# Patient Record
Sex: FEMALE | Race: WHITE | NOT HISPANIC OR LATINO | Employment: OTHER | URBAN - NONMETROPOLITAN AREA
[De-identification: names, ages, dates, MRNs, and addresses within clinical notes are randomized per-mention and may not be internally consistent; named-entity substitution may affect disease eponyms.]

---

## 2017-06-23 ENCOUNTER — HISTORY (OUTPATIENT)
Dept: FAMILY MEDICINE | Facility: OTHER | Age: 62
End: 2017-06-23

## 2017-06-23 ENCOUNTER — OFFICE VISIT - GICH (OUTPATIENT)
Dept: FAMILY MEDICINE | Facility: OTHER | Age: 62
End: 2017-06-23

## 2017-06-23 DIAGNOSIS — N30.00 ACUTE CYSTITIS WITHOUT HEMATURIA: ICD-10-CM

## 2017-06-23 DIAGNOSIS — R30.0 DYSURIA: ICD-10-CM

## 2017-06-23 LAB
BACTERIA URINE: ABNORMAL BACTERIA/HPF
BILIRUB UR QL: NEGATIVE
CLARITY, URINE: ABNORMAL CLARITY
COLOR UR: YELLOW COLOR
EPITHELIAL CELLS: ABNORMAL EPI/HPF
GLUCOSE URINE: NEGATIVE MG/DL
KETONES UR QL: NEGATIVE MG/DL
LEUKOCYTE ESTERASE URINE: ABNORMAL
NITRITE UR QL STRIP: NEGATIVE
OCCULT BLOOD,URINE - HISTORICAL: ABNORMAL
PH UR: 5.5 [PH]
PROTEIN QUALITATIVE,URINE - HISTORICAL: NEGATIVE MG/DL
RBC - HISTORICAL: ABNORMAL /HPF
SP GR UR STRIP: 1.01
UROBILINOGEN,QUALITATIVE - HISTORICAL: NORMAL EU/DL
WBC - HISTORICAL: ABNORMAL /HPF

## 2017-06-25 LAB
CULTURE - HISTORICAL: ABNORMAL
CULTURE - HISTORICAL: ABNORMAL
SUSCEPTIBILITY RESULT - HISTORICAL: ABNORMAL

## 2017-12-28 NOTE — PROGRESS NOTES
Patient Information     Patient Name Ada Arevalo [id number] Female 1955      Progress Notes by Kandis Hauser NP at 2017  3:15 PM     Author:  Kandis Hauser NP Service:  (none) Author Type:  PHYS- Nurse Practitioner     Filed:  2017  5:55 PM Encounter Date:  2017 Status:  Signed     :  Kandis Hauser NP (PHYS- Nurse Practitioner)            HPI:    Ada Bal is a 62 y.o. female who presents to clinic today for urinary concerns. Having burning, frequency and urgency with urination. Having some pelvic pressure. Sx worse today. Sx started last night. No fever. No hematuria. No n/v/d. No flank pain. Taking cranberry juice for sx. No recurrent UTI's.     Past Medical History:     Diagnosis  Date     Hx of Migraine with neurologic symptoms of expessive aphasia X2.      NVD (normal vaginal delivery)     x2      Premenstrual symptom      anxiety and irritability      Past Surgical History:      Procedure  Laterality Date     TONSIL AND ADENOIDECTOMY  1967     Social History       Substance Use Topics         Smoking status:   Never Smoker     Smokeless tobacco:   Never Used     Alcohol use   Yes      Comment: 1/2 a beer about once a month      Current Outpatient Prescriptions       Medication  Sig Dispense Refill     aspirin enteric coated 81 mg tablet Take 1 tablet by mouth once daily with a meal.  0     omega-3 fatty acids-vitamin E (FISH OIL) 1,000 mg cap Take 1 capsule by mouth once daily.  0     zolpidem (AMBIEN) 10 mg tablet Take 1 tablet by mouth at bedtime if needed for Sleep. 30 tablet 3     No current facility-administered medications for this visit.      Medications have been reviewed by me and are current to the best of my knowledge and ability.    No Known Allergies    ROS:  Pertinent positives and negatives are noted in HPI.    EXAM:  General appearance: well appearing female, in no acute distress  Respiratory: clear to auscultation bilaterally  Cardiac:  RRR with no murmurs  Abdomen: soft, nontender, no CVAT  Psychological: normal affect, alert and pleasant  Lab:   Results for orders placed or performed in visit on 06/23/17      URINALYSIS W REFLEX MICROSCOPIC IF POSITIVE      Result  Value Ref Range    COLOR                     Yellow Yellow Color    CLARITY                   Slightly Cloudy (A) Clear Clarity    SPECIFIC GRAVITY,URINE    1.010 1.010, 1.015, 1.020, 1.025                    PH,URINE                  5.5 6.0, 7.0, 8.0, 5.5, 6.5, 7.5, 8.5                    UROBILINOGEN,QUALITATIVE  Normal Normal EU/dl    PROTEIN, URINE Negative Negative mg/dL    GLUCOSE, URINE Negative Negative mg/dL    KETONES,URINE             Negative Negative mg/dL    BILIRUBIN,URINE           Negative Negative                    OCCULT BLOOD,URINE        Moderate (A) Negative                    NITRITE                   Negative Negative                    LEUKOCYTE ESTERASE        Small (A) Negative                   URINALYSIS MICROSCOPIC      Result  Value Ref Range    RBC 3-5 (A) 0-2, None Seen /HPF    WBC 6-10 (A) 0-2, 3-5, None Seen /HPF    BACTERIA                  Few None Seen, Rare, Occasional, Few Bacteria/HPF    EPITHELIAL CELLS          Few None Seen, Few Epi/HPF         ASSESSMENT/PLAN:    ICD-10-CM    1. Acute cystitis without hematuria N30.00 URINE CULTURE      trimethoprim-sulfamethoxazole, 160-800 mg, (BACTRIM DS, SEPTRA DS) tablet      URINE CULTURE   2. Dysuria R30.0 URINALYSIS W REFLEX MICROSCOPIC IF POSITIVE      URINALYSIS W REFLEX MICROSCOPIC IF POSITIVE      URINALYSIS MICROSCOPIC      URINALYSIS MICROSCOPIC      URINE CULTURE      URINE CULTURE   UA with leukocytes, few bacteria. She does have classic sx. Will tx empirically with bactrim and UC ordered. Reviewed need to complete all antibiotics. Discussed typical course of illness, symptomatic treatment and when to return to clinic. Patient in agreement with plan and all questions were answered.      Patient Instructions   Antibiotics per order.  Drink plenty of fluids.   Return to clinic if any fevers, vomiting, or flank pain develops as this may be a sign the infection has moved to your kidney's.  We have initiated a urine culture. If any changes are needed in your antibiotics, we will notify you when the results have returned.     AZO

## 2017-12-28 NOTE — PATIENT INSTRUCTIONS
Patient Information     Patient Name MRAda Jackson [id number] Female 1955      Patient Instructions by Kandis Hauser NP at 2017  3:15 PM     Author:  Kandis Hauser NP Service:  (none) Author Type:  PHYS- Nurse Practitioner     Filed:  2017  4:04 PM Encounter Date:  2017 Status:  Signed     :  Kandis Hauser NP (PHYS- Nurse Practitioner)            Antibiotics per order.  Drink plenty of fluids.   Return to clinic if any fevers, vomiting, or flank pain develops as this may be a sign the infection has moved to your kidney's.  We have initiated a urine culture. If any changes are needed in your antibiotics, we will notify you when the results have returned.     AZO

## 2017-12-30 NOTE — NURSING NOTE
Patient Information     Patient Name MRN Ada Shrestha [id number] Female 1955      Nursing Note by Gladys Pichardo at 2017  3:15 PM     Author:  Gladys Pichardo Service:  (none) Author Type:  (none)     Filed:  2017  3:59 PM Encounter Date:  2017 Status:  Signed     :  Gladys Pichardo            Patient is here today with c/o dysuria and frequency. Gladys Pichardo LPN......................2017 3:43 PM

## 2018-01-16 RX ORDER — CHLORAL HYDRATE 500 MG
1 CAPSULE ORAL
COMMUNITY
Start: 2012-12-12

## 2018-01-16 RX ORDER — ECHINACEA 500 MG
CAPSULE ORAL
COMMUNITY
Start: 2017-06-23 | End: 2019-02-08

## 2018-01-16 RX ORDER — ASPIRIN 81 MG/1
81 TABLET ORAL
COMMUNITY
Start: 2012-12-12

## 2018-01-27 VITALS
TEMPERATURE: 98.5 F | HEIGHT: 63 IN | DIASTOLIC BLOOD PRESSURE: 68 MMHG | BODY MASS INDEX: 27.04 KG/M2 | HEART RATE: 68 BPM | SYSTOLIC BLOOD PRESSURE: 122 MMHG | WEIGHT: 152.6 LBS

## 2018-03-25 ENCOUNTER — HEALTH MAINTENANCE LETTER (OUTPATIENT)
Age: 63
End: 2018-03-25

## 2019-02-08 ENCOUNTER — OFFICE VISIT (OUTPATIENT)
Dept: FAMILY MEDICINE | Facility: OTHER | Age: 64
End: 2019-02-08
Attending: NURSE PRACTITIONER
Payer: MEDICARE

## 2019-02-08 ENCOUNTER — HOSPITAL ENCOUNTER (OUTPATIENT)
Dept: GENERAL RADIOLOGY | Facility: OTHER | Age: 64
Discharge: HOME | End: 2019-02-08
Attending: PHYSICIAN ASSISTANT | Admitting: PHYSICIAN ASSISTANT
Payer: MEDICARE

## 2019-02-08 VITALS
TEMPERATURE: 101.4 F | HEIGHT: 64 IN | RESPIRATION RATE: 24 BRPM | DIASTOLIC BLOOD PRESSURE: 76 MMHG | SYSTOLIC BLOOD PRESSURE: 136 MMHG | WEIGHT: 154.8 LBS | OXYGEN SATURATION: 91 % | BODY MASS INDEX: 26.43 KG/M2 | HEART RATE: 117 BPM

## 2019-02-08 DIAGNOSIS — R05.9 COUGH: ICD-10-CM

## 2019-02-08 DIAGNOSIS — J18.9 COMMUNITY ACQUIRED PNEUMONIA OF RIGHT LUNG, UNSPECIFIED PART OF LUNG: Primary | ICD-10-CM

## 2019-02-08 PROCEDURE — G0463 HOSPITAL OUTPT CLINIC VISIT: HCPCS

## 2019-02-08 PROCEDURE — 99214 OFFICE O/P EST MOD 30 MIN: CPT | Performed by: PHYSICIAN ASSISTANT

## 2019-02-08 PROCEDURE — 71046 X-RAY EXAM CHEST 2 VIEWS: CPT

## 2019-02-08 PROCEDURE — G0463 HOSPITAL OUTPT CLINIC VISIT: HCPCS | Mod: 25

## 2019-02-08 RX ORDER — BENZONATATE 100 MG/1
CAPSULE ORAL
Qty: 30 CAPSULE | Refills: 0 | Status: SHIPPED | OUTPATIENT
Start: 2019-02-08 | End: 2019-12-05

## 2019-02-08 RX ORDER — DOXYCYCLINE 100 MG/1
100 CAPSULE ORAL 2 TIMES DAILY
Qty: 14 CAPSULE | Refills: 0 | Status: SHIPPED | OUTPATIENT
Start: 2019-02-08 | End: 2019-02-15

## 2019-02-08 SDOH — HEALTH STABILITY: MENTAL HEALTH: HOW OFTEN DO YOU HAVE A DRINK CONTAINING ALCOHOL?: NEVER

## 2019-02-08 ASSESSMENT — PAIN SCALES - GENERAL: PAINLEVEL: NO PAIN (0)

## 2019-02-08 ASSESSMENT — MIFFLIN-ST. JEOR: SCORE: 1238.04

## 2019-02-08 NOTE — NURSING NOTE
Chief Complaint   Patient presents with     Cough       Medication Reconciliation: complete   Patient presents with cough nonproductive starting last Saturday. Patient has tried mucinex with no relief. Mera Crenshaw LPN .............2/8/2019  1:59 PM

## 2019-02-08 NOTE — PATIENT INSTRUCTIONS
Community acquired pneumonia  Chest xray shows a consolidation on the right side  Rest, fluids  Humidified air  Start doxycycline 100 mg oral tablet, take every 12 hours for 7 days. This can cause sun sensitivity, use caution in the sun. Take with food.   OTC Robitussin DM every 4 hours as needed for cough  Benzonatate 100 mg oral capsule, take 1-2 capsules 3 times daily as needed for cough  Ibuprofen or tylenol as needed for comfort, fever  Follow up with PCP if symptoms persist or worsen  Seek immediate care for    You don t get better within the first 48 hours of treatment    Shortness of breath gets worse    Rapid breathing (more than 25 breaths per minute)    Coughing up blood    Chest pain gets worse with breathing    Fever of 100.4 F (38 C) or higher that doesn t get better with fever medicine    Weakness, dizziness, or fainting that gets worse    Thirst or dry mouth that gets worse    Sinus pain, headache, or a stiff neck    Chest pain not caused by coughing    Patient Education     Pneumonia (Adult)  Pneumonia is an infection deep within the lungs. It is in the small air sacs (alveoli). Pneumonia may be caused by a virus or bacteria. Pneumonia caused by bacteria is usually treated with an antibiotic. Severe cases may need to be treated in the hospital. Milder cases can be treated at home. Symptoms usually start to get better during the first 2 days of treatment.    Home care  Follow these guidelines when caring for yourself at home:    Rest at home for the first 2 to 3 days, or until you feel stronger. Don t let yourself get overly tired when you go back to your activities.    Stay away from cigarette smoke - yours or other people s.    You may use acetaminophen or ibuprofen to control fever or pain, unless another medicine was prescribed. If you have chronic liver or kidney disease, talk with your healthcare provider before using these medicines. Also talk with your provider if you ve had a stomach ulcer  or gastrointestinal bleeding. Don t give aspirin to anyone younger than 18 years of age who is ill with a fever. It may cause severe liver damage.    Your appetite may be poor, so a light diet is fine.    Drink 6 to 8 glasses of fluids every day to make sure you are getting enough fluids. Beverages can include water, sport drinks, sodas without caffeine, juices, tea, or soup. Fluids will help loosen secretions in the lung. This will make it easier for you to cough up the phlegm (sputum). If you also have heart or kidney disease, check with your healthcare provider before you drink extra fluids.    Take antibiotic medicine prescribed until it is all gone, even if you are feeling better after a few days.  Follow-up care  Follow up with your healthcare provider in the next 2 to 3 days, or as advised. This is to be sure the medicine is helping you get better.  If you are 65 or older, you should get a pneumococcal vaccine and a yearly flu (influenza) shot. You should also get these vaccines if you have chronic lung disease like asthma, emphysema, or COPD. Recently, a second type of pneumonia vaccine has become available for everyone over 65 years old. This is in addition to the previous vaccine. Ask your provider about this.  When to seek medical advice  Call your healthcare provider right away if any of these occur:    You don t get better within the first 48 hours of treatment    Shortness of breath gets worse    Rapid breathing (more than 25 breaths per minute)    Coughing up blood    Chest pain gets worse with breathing    Fever of 100.4 F (38 C) or higher that doesn t get better with fever medicine    Weakness, dizziness, or fainting that gets worse    Thirst or dry mouth that gets worse    Sinus pain, headache, or a stiff neck    Chest pain not caused by coughing  Date Last Reviewed: 1/1/2017 2000-2018 [employer]. [address] [place] [address], [place] [address]. All rights reserved. This information is  not intended as a substitute for professional medical care. Always follow your healthcare professional's instructions.

## 2019-02-08 NOTE — PROGRESS NOTES
"SUBJECTIVE:  Ada Bal is a 64 year old female who presents to the clinic today with a dry harsh cough  Onset 8 days ago,course is worsening  Associated symptoms: mild headache, hoarse, chest discomfort with cough    Treatments - Mucinex D for a week without relief  Exposures -  was ill with similar symptoms  History of asthma and environmental allergies is - negative  Tobacco use or second hand smoke exposure history - negative  No prior pneumonia      Past Medical History:   Diagnosis Date     Encounter for full-term uncomplicated delivery     x2     Migraine without status migrainosus, not intractable     No Comments Provided     Premenstrual tension syndrome     anxiety and irritability      Social History     Tobacco Use     Smoking status: Never Smoker     Smokeless tobacco: Never Used   Substance Use Topics     Alcohol use: No     Frequency: Never     Current Outpatient Medications   Medication     aspirin EC 81 MG EC tablet     fish oil-omega-3 fatty acids 1000 MG capsule     No current facility-administered medications for this visit.       No Known Allergies      ROS  General - fatigue, fever (Max 101.4 in the office)  HENT - negative  Respiratory - POSITIVE per HPI  Abdomen - decreased appetite  Skin - no rash      OBJECTIVE:  Exam:  Vitals:    02/08/19 1359   BP: 136/76   BP Location: Right arm   Patient Position: Sitting   Cuff Size: Adult Regular   Pulse: 117   Resp: 24   Temp: 101.4  F (38.6  C)   TempSrc: Tympanic   SpO2: 91%   Weight: 70.2 kg (154 lb 12.8 oz)   Height: 1.635 m (5' 4.37\")     General: healthy, alert and no distress, appears hydarated, vital signs stable   Head: NORMAL - atraumatic, nontender.  Ears: normal canals, TMs bilaterally  Eyes: NORMAL - no injection no discharge, no periorbital swelling.  Nose: ABNORMAL - swollen nasal turbinates.  Neck: adenopathy noted  Throat: ABNORMAL - moderate erythema, s/p tonsillitis.  Resp: ABNORMAL - normal respiration,  Wheeze "   Cardiac: NORMAL - regular rate and rhythm without murmur.    Recent Results (from the past 24 hour(s))   XR Chest 2 Views    Narrative    PROCEDURE:  XR CHEST 2 VW    HISTORY: cough for 8 days, chest discomfort with cough; Cough, .    COMPARISON:  None.    FINDINGS:  The cardiomediastinal contours are normal.  The trachea is midline.  Small amount of ill-defined consolidation projects over the right  lower lobe. No effusion or pneumothorax is seen.    No suspicious osseous lesion or subdiaphragmatic free air.      Impression    IMPRESSION:      Findings suspicious for right lower lobe pneumonia. Follow-up to  resolution is requested.      MARC CALVERT MD         ASSESSMENT:    (J18.9) Community acquired pneumonia of right lung, unspecified part of lung  (primary encounter diagnosis)  Plan: doxycycline monohydrate (MONODOX) 100 MG         capsule, benzonatate (TESSALON) 100 MG capsule      (R05) Cough  Plan: XR Chest 2 Views    Community acquired pneumonia  Chest xray shows a consolidation on the right side  Rest, fluids  Humidified air  Start doxycycline 100 mg oral tablet, take every 12 hours for 7 days. This can cause sun sensitivity, use caution in the sun. Take with food.   OTC Robitussin DM every 4 hours as needed for cough  Benzonatate 100 mg oral capsule, take 1-2 capsules 3 times daily as needed for cough  Ibuprofen or tylenol as needed for comfort, fever  Follow up with PCP if symptoms persist or worsen  Patient received verbal and written instruction including review of warning signs & follow up    Rhonda Marquez PA-C on 2/8/2019 at 5:10 PM

## 2019-02-11 ENCOUNTER — HOSPITAL ENCOUNTER (EMERGENCY)
Facility: OTHER | Age: 64
Discharge: HOME | End: 2019-02-11
Attending: FAMILY MEDICINE | Admitting: FAMILY MEDICINE
Payer: MEDICARE

## 2019-02-11 ENCOUNTER — APPOINTMENT (OUTPATIENT)
Dept: GENERAL RADIOLOGY | Facility: OTHER | Age: 64
End: 2019-02-11
Attending: FAMILY MEDICINE
Payer: MEDICARE

## 2019-02-11 VITALS
DIASTOLIC BLOOD PRESSURE: 76 MMHG | HEIGHT: 64 IN | OXYGEN SATURATION: 96 % | WEIGHT: 150 LBS | BODY MASS INDEX: 25.61 KG/M2 | SYSTOLIC BLOOD PRESSURE: 121 MMHG | HEART RATE: 84 BPM | TEMPERATURE: 98.6 F | RESPIRATION RATE: 10 BRPM

## 2019-02-11 DIAGNOSIS — R05.9 COUGH: ICD-10-CM

## 2019-02-11 LAB
ANION GAP SERPL CALCULATED.3IONS-SCNC: 7 MMOL/L (ref 3–14)
BASOPHILS # BLD AUTO: 0 10E9/L (ref 0–0.2)
BASOPHILS NFR BLD AUTO: 0.6 %
BUN SERPL-MCNC: 12 MG/DL (ref 7–25)
CALCIUM SERPL-MCNC: 10.9 MG/DL (ref 8.6–10.3)
CHLORIDE SERPL-SCNC: 101 MMOL/L (ref 98–107)
CO2 SERPL-SCNC: 31 MMOL/L (ref 21–31)
CREAT SERPL-MCNC: 0.72 MG/DL (ref 0.6–1.2)
DIFFERENTIAL METHOD BLD: NORMAL
EOSINOPHIL # BLD AUTO: 0.1 10E9/L (ref 0–0.7)
EOSINOPHIL NFR BLD AUTO: 1.7 %
ERYTHROCYTE [DISTWIDTH] IN BLOOD BY AUTOMATED COUNT: 12 % (ref 10–15)
GFR SERPL CREATININE-BSD FRML MDRD: 81 ML/MIN/{1.73_M2}
GLUCOSE SERPL-MCNC: 113 MG/DL (ref 70–105)
HCT VFR BLD AUTO: 43 % (ref 35–47)
HGB BLD-MCNC: 14.4 G/DL (ref 11.7–15.7)
IMM GRANULOCYTES # BLD: 0 10E9/L (ref 0–0.4)
IMM GRANULOCYTES NFR BLD: 0.2 %
LACTATE SERPL-SCNC: 0.9 MMOL/L (ref 0.5–2.2)
LYMPHOCYTES # BLD AUTO: 1.2 10E9/L (ref 0.8–5.3)
LYMPHOCYTES NFR BLD AUTO: 22.4 %
MCH RBC QN AUTO: 31.4 PG (ref 26.5–33)
MCHC RBC AUTO-ENTMCNC: 33.5 G/DL (ref 31.5–36.5)
MCV RBC AUTO: 94 FL (ref 78–100)
MONOCYTES # BLD AUTO: 0.6 10E9/L (ref 0–1.3)
MONOCYTES NFR BLD AUTO: 12.1 %
NEUTROPHILS # BLD AUTO: 3.3 10E9/L (ref 1.6–8.3)
NEUTROPHILS NFR BLD AUTO: 63 %
PLATELET # BLD AUTO: 283 10E9/L (ref 150–450)
POTASSIUM SERPL-SCNC: 4 MMOL/L (ref 3.5–5.1)
RBC # BLD AUTO: 4.59 10E12/L (ref 3.8–5.2)
SODIUM SERPL-SCNC: 139 MMOL/L (ref 134–144)
WBC # BLD AUTO: 5.2 10E9/L (ref 4–11)

## 2019-02-11 PROCEDURE — 96366 THER/PROPH/DIAG IV INF ADDON: CPT | Performed by: FAMILY MEDICINE

## 2019-02-11 PROCEDURE — 96361 HYDRATE IV INFUSION ADD-ON: CPT | Performed by: FAMILY MEDICINE

## 2019-02-11 PROCEDURE — 83605 ASSAY OF LACTIC ACID: CPT | Performed by: FAMILY MEDICINE

## 2019-02-11 PROCEDURE — 87040 BLOOD CULTURE FOR BACTERIA: CPT | Performed by: FAMILY MEDICINE

## 2019-02-11 PROCEDURE — 36415 COLL VENOUS BLD VENIPUNCTURE: CPT | Performed by: FAMILY MEDICINE

## 2019-02-11 PROCEDURE — 99284 EMERGENCY DEPT VISIT MOD MDM: CPT | Mod: 25 | Performed by: FAMILY MEDICINE

## 2019-02-11 PROCEDURE — 80048 BASIC METABOLIC PNL TOTAL CA: CPT | Performed by: FAMILY MEDICINE

## 2019-02-11 PROCEDURE — 85025 COMPLETE CBC W/AUTO DIFF WBC: CPT | Performed by: FAMILY MEDICINE

## 2019-02-11 PROCEDURE — 71046 X-RAY EXAM CHEST 2 VIEWS: CPT

## 2019-02-11 PROCEDURE — 25000128 H RX IP 250 OP 636: Performed by: FAMILY MEDICINE

## 2019-02-11 PROCEDURE — 99283 EMERGENCY DEPT VISIT LOW MDM: CPT | Mod: Z6 | Performed by: FAMILY MEDICINE

## 2019-02-11 PROCEDURE — 87040 BLOOD CULTURE FOR BACTERIA: CPT | Mod: 91 | Performed by: FAMILY MEDICINE

## 2019-02-11 PROCEDURE — 96365 THER/PROPH/DIAG IV INF INIT: CPT | Performed by: FAMILY MEDICINE

## 2019-02-11 RX ORDER — LEVOFLOXACIN 750 MG/1
750 TABLET, FILM COATED ORAL DAILY
Qty: 6 TABLET | Refills: 0 | Status: SHIPPED | OUTPATIENT
Start: 2019-02-11 | End: 2019-02-17

## 2019-02-11 RX ORDER — SODIUM CHLORIDE 9 MG/ML
1000 INJECTION, SOLUTION INTRAVENOUS CONTINUOUS
Status: DISCONTINUED | OUTPATIENT
Start: 2019-02-11 | End: 2019-02-11 | Stop reason: HOSPADM

## 2019-02-11 RX ORDER — LEVOFLOXACIN 5 MG/ML
750 INJECTION, SOLUTION INTRAVENOUS ONCE
Status: COMPLETED | OUTPATIENT
Start: 2019-02-11 | End: 2019-02-11

## 2019-02-11 RX ADMIN — SODIUM CHLORIDE 1000 ML: 900 INJECTION, SOLUTION INTRAVENOUS at 13:33

## 2019-02-11 RX ADMIN — LEVOFLOXACIN 750 MG: 5 INJECTION, SOLUTION INTRAVENOUS at 13:31

## 2019-02-11 ASSESSMENT — MIFFLIN-ST. JEOR: SCORE: 1210.4

## 2019-02-11 NOTE — ED PROVIDER NOTES
History   No chief complaint on file.    HPI  Ada Bal is a 64 year old female who has had a cough for about 10 days.  She went in Friday and received doxycycline for a possible early RML pneumonia.  Symptoms have not improved.    Now she is having coughing fits that take her breath away.    Some fevers.  Some chills, no rigors.    No SOB.    No pleuritic pain and no hx of PE/DVT.  No obvious risk factors.  PE considered but deemed unlikely.     No chest pressure or other cardiac equivalents.    Allergies:  No Known Allergies    Problem List:    Patient Active Problem List    Diagnosis Date Noted     Routine general medical examination at a health care facility 10/27/2010     Priority: Medium     Migraine headache 1999     Priority: Medium     Overview:   with secondary neurologic symptoms of expressive aphasia and speech   difficulties, resolved          Past Medical History:    Past Medical History:   Diagnosis Date     Encounter for full-term uncomplicated delivery      Migraine without status migrainosus, not intractable      Premenstrual tension syndrome        Past Surgical History:    Past Surgical History:   Procedure Laterality Date     TONSILLECTOMY, ADENOIDECTOMY, COMBINED      1967       Family History:    Family History   Problem Relation Age of Onset     Heart Disease Mother         Heart Disease     Other - See Comments Mother         Dementia at time of death     Heart Disease Father         Heart Disease, of complications of CAD at age 77, s/p bypass surgery     Breast Cancer No family hx of         Cancer-breast       Social History:  Marital Status:   [2]  Social History     Tobacco Use     Smoking status: Never Smoker     Smokeless tobacco: Never Used   Substance Use Topics     Alcohol use: No     Frequency: Never     Drug use: No        Medications:      aspirin EC 81 MG EC tablet   benzonatate (TESSALON) 100 MG capsule   doxycycline monohydrate (MONODOX) 100 MG  "capsule   levofloxacin (LEVAQUIN) 750 MG tablet   fish oil-omega-3 fatty acids 1000 MG capsule         Review of Systems  No HEENT, abdominal concerns, N/V/D  Physical Exam   BP: 131/82  Pulse: 89  Temp: 98.6  F (37  C)  Resp: 18  Height: 162.6 cm (5' 4\")  Weight: 68 kg (150 lb)  SpO2: 96 %      Physical Exam  Well woman.  Coughing extensively.  Not diaphroretic.  BP slightly lower than baseline, but I don't think she is septic.  Heart reg without tachycardia.  Lungs with crackles right middle area, no basilar rales or wheezing.  CXR shows a worsening infiltrate in the RML.      Interestingly, WBC is normal.  This is why I was considering other etiologies, but I do ultimately think she has a pneumonia as primary problem.    Slight hypercalcemia, likely a bit dehydrated.    She is given Levaquin IV as a single dose.    She is given one liter of NS.      ED Course     ED Course as of Feb 11 1454   Mon Feb 11, 2019   1307 Calcium: (!) 10.9   1307 Calcium: (!) 10.9     Procedures               Critical Care time:  none               Results for orders placed or performed during the hospital encounter of 02/11/19 (from the past 24 hour(s))   XR Chest 2 Views    Narrative    PROCEDURE:  XR CHEST 2 VW    HISTORY: continued cough, .    COMPARISON:  2/8/2019    FINDINGS:  The cardiomediastinal contours are normal.  The trachea is midline.  Focal consolidation in the right lower lobe persists, somewhat more  conspicuous on the current study when compared to prior. No effusion  or pneumothorax is seen.    No suspicious osseous lesion or subdiaphragmatic free air.      Impression    IMPRESSION:      Progressive consolidation within the right lower lobe. Radiographic  findings may be delayed relative to the clinical progress of  pneumonia. Follow-up to resolution is advised.    MARC CALVERT MD   CBC with platelets differential   Result Value Ref Range    WBC 5.2 4.0 - 11.0 10e9/L    RBC Count 4.59 3.8 - 5.2 10e12/L    " Hemoglobin 14.4 11.7 - 15.7 g/dL    Hematocrit 43.0 35.0 - 47.0 %    MCV 94 78 - 100 fl    MCH 31.4 26.5 - 33.0 pg    MCHC 33.5 31.5 - 36.5 g/dL    RDW 12.0 10.0 - 15.0 %    Platelet Count 283 150 - 450 10e9/L    Diff Method Automated Method     % Neutrophils 63.0 %    % Lymphocytes 22.4 %    % Monocytes 12.1 %    % Eosinophils 1.7 %    % Basophils 0.6 %    % Immature Granulocytes 0.2 %    Absolute Neutrophil 3.3 1.6 - 8.3 10e9/L    Absolute Lymphocytes 1.2 0.8 - 5.3 10e9/L    Absolute Monocytes 0.6 0.0 - 1.3 10e9/L    Absolute Eosinophils 0.1 0.0 - 0.7 10e9/L    Absolute Basophils 0.0 0.0 - 0.2 10e9/L    Abs Immature Granulocytes 0.0 0 - 0.4 10e9/L   Basic metabolic panel   Result Value Ref Range    Sodium 139 134 - 144 mmol/L    Potassium 4.0 3.5 - 5.1 mmol/L    Chloride 101 98 - 107 mmol/L    Carbon Dioxide 31 21 - 31 mmol/L    Anion Gap 7 3 - 14 mmol/L    Glucose 113 (H) 70 - 105 mg/dL    Urea Nitrogen 12 7 - 25 mg/dL    Creatinine 0.72 0.60 - 1.20 mg/dL    GFR Estimate 81 >60 mL/min/[1.73_m2]    GFR Estimate If Black >90 >60 mL/min/[1.73_m2]    Calcium 10.9 (H) 8.6 - 10.3 mg/dL   Lactic acid   Result Value Ref Range    Lactic Acid 0.9 0.5 - 2.2 mmol/L       Medications   0.9% sodium chloride BOLUS (1,000 mLs Intravenous New Bag 2/11/19 1333)     Followed by   sodium chloride 0.9% infusion (not administered)   levofloxacin (LEVAQUIN) infusion 750 mg (750 mg Intravenous New Bag 2/11/19 1331)       Assessments & Plan (with Medical Decision Making)     I have reviewed the nursing notes.    I have reviewed the findings, diagnosis, plan and need for follow up with the patient.   I do not feel the patient needs to be admitted.  Should she continue to feel worse, she should return here.  However, I believe the IV dose of levaquin and change to oral levaquin from doxycycline will resolve this obvious pneumonia.    Other return precautions given.    She felt better after her IV fluid and levaquin.  I feel she is  medically stable for discharge.       Medication List      Started    levofloxacin 750 MG tablet  Commonly known as:  LEVAQUIN  750 mg, Oral, DAILY            Final diagnoses:   Cough       2/11/2019   [facility]     AlcantaraLiborio lai MD  02/11/19 0760

## 2019-02-11 NOTE — ED AVS SNAPSHOT
[facility]  [address]  [place] [address]  Phone:  [phone]  Fax:  [phone]                                    Ada Bal   MRN: [id number]    Department:  [facility]   Date of Visit:  2/11/2019           After Visit Summary Signature Page    I have received my discharge instructions, and my questions have been answered. I have discussed any challenges I see with this plan with the nurse or doctor.    ..........................................................................................................................................  Patient/Patient Representative Signature      ..........................................................................................................................................  Patient Representative Print Name and Relationship to Patient    ..................................................               ................................................  Date                                   Time    ..........................................................................................................................................  Reviewed by Signature/Title    ...................................................              ..............................................  Date                                               Time          22EPIC Rev 08/18

## 2019-02-11 NOTE — ED TRIAGE NOTES
Pt reports being dx with pneumonia. Pt is on cough medication and antibiotics. Pt reports having coughing episodes in which she can not breath, not much improvement since starting abx on Friday.    Nancy Gordon RN on 2/11/2019 at 11:51 AM

## 2019-02-17 LAB
BACTERIA SPEC CULT: NORMAL
BACTERIA SPEC CULT: NORMAL
SPECIMEN SOURCE: NORMAL
SPECIMEN SOURCE: NORMAL

## 2019-02-18 ENCOUNTER — MYC MEDICAL ADVICE (OUTPATIENT)
Dept: FAMILY MEDICINE | Facility: OTHER | Age: 64
End: 2019-02-18

## 2019-05-28 ENCOUNTER — MYC MEDICAL ADVICE (OUTPATIENT)
Dept: FAMILY MEDICINE | Facility: OTHER | Age: 64
End: 2019-05-28

## 2019-12-05 ENCOUNTER — HOSPITAL ENCOUNTER (OUTPATIENT)
Facility: OTHER | Age: 64
End: 2019-12-05
Attending: SURGERY | Admitting: SURGERY
Payer: MEDICARE

## 2019-12-05 ENCOUNTER — OFFICE VISIT (OUTPATIENT)
Dept: FAMILY MEDICINE | Facility: OTHER | Age: 64
End: 2019-12-05
Attending: FAMILY MEDICINE
Payer: COMMERCIAL

## 2019-12-05 VITALS
SYSTOLIC BLOOD PRESSURE: 128 MMHG | RESPIRATION RATE: 16 BRPM | HEIGHT: 63 IN | HEART RATE: 64 BPM | BODY MASS INDEX: 26.82 KG/M2 | WEIGHT: 151.4 LBS | TEMPERATURE: 98.4 F | OXYGEN SATURATION: 94 % | DIASTOLIC BLOOD PRESSURE: 76 MMHG

## 2019-12-05 DIAGNOSIS — Z12.11 SPECIAL SCREENING FOR MALIGNANT NEOPLASMS, COLON: ICD-10-CM

## 2019-12-05 DIAGNOSIS — Z00.00 ENCOUNTER FOR MEDICARE ANNUAL WELLNESS EXAM: Primary | ICD-10-CM

## 2019-12-05 DIAGNOSIS — Z12.31 ENCOUNTER FOR SCREENING MAMMOGRAM FOR BREAST CANCER: ICD-10-CM

## 2019-12-05 DIAGNOSIS — Z12.11 SPECIAL SCREENING FOR MALIGNANT NEOPLASMS, COLON: Primary | ICD-10-CM

## 2019-12-05 DIAGNOSIS — M94.9 DISORDER OF BONE AND CARTILAGE: ICD-10-CM

## 2019-12-05 DIAGNOSIS — M89.9 DISORDER OF BONE AND CARTILAGE: ICD-10-CM

## 2019-12-05 DIAGNOSIS — R79.9 ABNORMAL BLOOD CHEMISTRY: ICD-10-CM

## 2019-12-05 PROCEDURE — G0402 INITIAL PREVENTIVE EXAM: HCPCS | Performed by: FAMILY MEDICINE

## 2019-12-05 PROCEDURE — G0463 HOSPITAL OUTPT CLINIC VISIT: HCPCS

## 2019-12-05 RX ORDER — ECHINACEA 500 MG
1 CAPSULE ORAL DAILY
COMMUNITY
Start: 2017-06-23 | End: 2022-04-25

## 2019-12-05 ASSESSMENT — PAIN SCALES - GENERAL: PAINLEVEL: NO PAIN (0)

## 2019-12-05 ASSESSMENT — MIFFLIN-ST. JEOR: SCORE: 1204.84

## 2019-12-05 NOTE — PATIENT INSTRUCTIONS
Patient Education   Personalized Prevention Plan  You are due for the preventive services outlined below.  Your care team is available to assist you in scheduling these services.  If you have already completed any of these items, please share that information with your care team to update in your medical record.  Health Maintenance Due   Topic Date Due     Osteoporosis Screening  1955     Hepatitis C Screening  1955     Discuss Advance Care Planning  1955     Colonoscopy  01/22/1964     HIV Screening  01/22/1969     Zoster (Shingles) Vaccine (1 of 2) 01/22/2004     Mammogram  10/31/2018     Annual Wellness Visit  01/22/2019     FALL RISK ASSESSMENT  01/22/2019     Pneumococcal Vaccine (1 of 2 - PCV13) 01/22/2019     Diptheria Tetanus Pertussis (DTAP/TDAP/TD) Vaccine (2 - Td) 08/26/2019     Flu Vaccine (1) 09/01/2019

## 2019-12-05 NOTE — PROGRESS NOTES
"SUBJECTIVE:   Ada Bal is a 64 year old female who presents for Preventive Visit.    Are you in the first 12 months of your Medicare Part B coverage?  Yes,  Visual Acuity:  Right Eye: 10/12.5   Left Eye: 10/20  Both Eyes: 10/12.5    Physical Health:    In general, how would you rate your overall physical health? good    Outside of work, how many days during the week do you exercise? 2-3 days/week    Outside of work, approximately how many minutes a day do you exercise?30-45 minutes    If you drink alcohol do you typically have >3 drinks per day or >7 drinks per week? No    Do you usually eat at least 4 servings of fruit and vegetables a day, include whole grains & fiber and avoid regularly eating high fat or \"junk\" foods? Yes    Do you have any problems taking medications regularly?  No    Do you have any side effects from medications? none    Needs assistance for the following daily activities: no assistance needed    Which of the following safety concerns are present in your home?  none identified     Hearing impairment: No    In the past 6 months, have you been bothered by leaking of urine? no    Mental Health:    In general, how would you rate your overall mental or emotional health? excellent  PHQ-2 Score: 0    Do you feel safe in your environment? Yes    Have you ever done Advance Care Planning? (For example, a Health Directive, POLST, or a discussion with a medical provider or your loved ones about your wishes): Yes, patient states has an Advance Care Planning document and will bring a copy to the clinic.    Additional concerns to address?  No    Fall risk:  Fallen 2 or more times in the past year?: No  Any fall with injury in the past year?: No  click delete button to remove this line now  Cognitive Screenin) Repeat 3 items (Leader, Season, Table)    2) Clock draw: NORMAL  3) 3 item recall: Recalls 1 object   Results: NORMAL clock, 1-2 items recalled: COGNITIVE IMPAIRMENT LESS " LIKELY    Mini-CogTM Copyright ANAIS Botello. Licensed by the author for use in [facility]; reprinted with permission (nicol@.[facility]). All rights reserved.      Do you have sleep apnea, excessive snoring or daytime drowsiness?: no    Reviewed and updated as needed this visit by clinical staff  Tobacco  Allergies  Meds  Problems  Med Hx  Surg Hx  Fam Hx  Soc Hx        Reviewed and updated as needed this visit by Provider        Social History     Tobacco Use     Smoking status: Never Smoker     Smokeless tobacco: Never Used   Substance Use Topics     Alcohol use: No     Frequency: Never     Comment: occassional                           Current providers sharing in care for this patient include:   Patient Care Team:  Tahira Narayan DO as PCP - General (Family Practice)    The following health maintenance items are reviewed in Epic and correct as of today:  Health Maintenance   Topic Date Due     DEXA  1955     HEPATITIS C SCREENING  1955     ADVANCE CARE PLANNING  1955     COLONOSCOPY  01/22/1964     HIV SCREENING  01/22/1969     ZOSTER IMMUNIZATION (1 of 2) 01/22/2004     MAMMO SCREENING  10/31/2018     MEDICARE ANNUAL WELLNESS VISIT  01/22/2019     FALL RISK ASSESSMENT  01/22/2019     PNEUMOCOCCAL IMMUNIZATION 65+ LOW/MEDIUM RISK (1 of 2 - PCV13) 01/22/2019     DTAP/TDAP/TD IMMUNIZATION (2 - Td) 08/26/2019     INFLUENZA VACCINE (1) 09/01/2019     LIPID  10/27/2021     PHQ-2  Completed     IPV IMMUNIZATION  Aged Out     MENINGITIS IMMUNIZATION  Aged Out     Pneumonia Vaccine:Adults age 65+ who have not received previous Pneumovax (PPSV23) or PCV13 as an adult: Should first be given PCV13 AND then should be given PPSV23 6-12 months after PCV13.  She will receive at her pharmacy.  Mammogram Screening: Mammogram Screening: Patient over age 50, mutual decision to screen reflected in health maintenance.    ROS:  Constitutional, HEENT, cardiovascular, pulmonary, GI, , musculoskeletal,  "neuro, skin, endocrine and psych systems are negative, except as otherwise noted.    OBJECTIVE:   /76   Pulse 64   Temp 98.4  F (36.9  C) (Temporal)   Resp 16   Ht 1.607 m (5' 3.25\")   Wt 68.7 kg (151 lb 6.4 oz)   SpO2 94%   BMI 26.61 kg/m   Estimated body mass index is 26.61 kg/m  as calculated from the following:    Height as of this encounter: 1.607 m (5' 3.25\").    Weight as of this encounter: 68.7 kg (151 lb 6.4 oz).  EXAM:   GENERAL: Healthy, alert and no distress  NECK: Supple.  RESP: Normal rate and effort.  CV: Regular rates  ABDOMEN: Soft.  MS: no gross musculoskeletal defects noted, no edema    ASSESSMENT / PLAN:   1. Encounter for Medicare annual wellness exam  Healthy 64 year-old female without significant past medical history.  - Daily ASA use.  - BP at goal <130/80.  - No statin therapy.  Lipid screen ordered to further assess ASCVD risk.  - No nephropathy or diabetes screening indicated.  - Due for breast cancer screening.  Mammogram ordered.  - Due for colon cancer screening.  Referral for colonoscopy.  - Two previous pap smear results reviewed and within normal limits.  No further cervical cancer screening indicated.  - DXA ordered for evaluation of osteopenia/osteoporosis.  - She will receive pneumococcal vaccine at her pharmacy and consider influenza immunization.  She will be placed on list for Shingrix vaccine.  - Counseled on safety and appropriate diet and exercise.  - No cognitive impairment.  - No depression.    2. Encounter for screening mammogram for breast cancer  - MA Screening Digital Bilateral; Future    3. Special screening for malignant neoplasms, colon  - GASTROENTEROLOGY ADULT REF PROCEDURE ONLY    4. Disorder of bone and cartilage  - DX Hip/Pelvis/Spine; Future    5. Abnormal blood chemistry  - Lipid Panel; Future    COUNSELING:  Reviewed preventive health counseling, as reflected in patient instructions       Regular exercise       Healthy diet/nutrition       " "Vision screening       Hearing screening       Dental care       Fall risk prevention       Immunizations       Osteoporosis Prevention/Bone Health       Colon cancer screening    Estimated body mass index is 26.61 kg/m  as calculated from the following:    Height as of this encounter: 1.607 m (5' 3.25\").    Weight as of this encounter: 68.7 kg (151 lb 6.4 oz).     reports that she has never smoked. She has never used smokeless tobacco.    Appropriate preventive services were discussed with this patient, including applicable screening as appropriate for cardiovascular disease, diabetes, osteopenia/osteoporosis, and glaucoma.  As appropriate for age/gender, discussed screening for colorectal cancer, prostate cancer, breast cancer, and cervical cancer. Checklist reviewing preventive services available has been given to the patient.    Reviewed patients plan of care and provided an AVS. The Basic Care Plan (routine screening as documented in Health Maintenance) for Ada meets the Care Plan requirement. This Care Plan has been established and reviewed with the Patient.    Counseling Resources:  ATP IV Guidelines  Pooled Cohorts Equation Calculator  Breast Cancer Risk Calculator  FRAX Risk Assessment  ICSI Preventive Guidelines  Dietary Guidelines for Americans, 2010  USDA's MyPlate  ASA Prophylaxis  Lung CA Screening    Tahira Narayan DO  [facility]  "

## 2019-12-05 NOTE — TELEPHONE ENCOUNTER
Screening Questions for the Scheduling of Screening Colonoscopies   (If Colonoscopy is diagnostic, Provider should review the chart before scheduling.)  Are you younger than 50 or older than 80?  NO   Do you take aspirin or fish oil?  YES - BOTH  (if yes, tell patient to stop 1 week prior to Colonoscopy)  Do you take warfarin (Coumadin), clopidogrel (Plavix), apixaban (Eliquis), dabigatram (Pradaxa), rivaroxaban (Xarelto) or any blood thinner? NO   Do you use oxygen at home?  NO   Do you have kidney disease? NO   Are you on dialysis? NO   Have you had a stroke or heart attack in the last year? NO   Have you had a stent in your heart or any blood vessel in the last year? NO   Have you had a transplant of any organ? NO   Have you had a colonoscopy or upper endoscopy (EGD) before? NO          When?    Date of scheduled Colonoscopy. 02/21/2020  Provider DAHL   [facility] - [facility]

## 2019-12-05 NOTE — NURSING NOTE
"Chief Complaint   Patient presents with     Medicare Visit     Welcome to Medicare       HEARING FREQUENCY    Right Ear:      1000 Hz RESPONSE- on Level:   20 db  (Conditioning sound)   1000 Hz: RESPONSE- on Level:   20 db    2000 Hz: RESPONSE- on Level:   20 db    4000 Hz: RESPONSE- on Level:   20 db     Left Ear:      4000 Hz: RESPONSE- on Level:   20 db    2000 Hz: RESPONSE- on Level:   20 db    1000 Hz: RESPONSE- on Level:   20 db     500 Hz: RESPONSE- on Level:   20 db     Right Ear:    500 Hz: RESPONSE- on Level:   20 db     Hearing Acuity: Pass    Hearing Assessment: normal      Initial /76   Pulse 64   Temp 98.4  F (36.9  C) (Temporal)   Resp 16   Ht 1.607 m (5' 3.25\")   Wt 68.7 kg (151 lb 6.4 oz)   SpO2 94%   BMI 26.61 kg/m   Estimated body mass index is 26.61 kg/m  as calculated from the following:    Height as of this encounter: 1.607 m (5' 3.25\").    Weight as of this encounter: 68.7 kg (151 lb 6.4 oz).    Medication Reconciliation: complete      Norma J. Gosselin, LPN  "

## 2019-12-10 RX ORDER — POLYETHYLENE GLYCOL 3350, SODIUM CHLORIDE, SODIUM BICARBONATE, POTASSIUM CHLORIDE 420; 11.2; 5.72; 1.48 G/4L; G/4L; G/4L; G/4L
4000 POWDER, FOR SOLUTION ORAL ONCE
Qty: 4000 ML | Refills: 0 | Status: SHIPPED | OUTPATIENT
Start: 2019-12-10 | End: 2019-12-10

## 2019-12-10 RX ORDER — BISACODYL 5 MG/1
TABLET, DELAYED RELEASE ORAL
Qty: 2 TABLET | Refills: 0 | Status: SHIPPED | OUTPATIENT
Start: 2019-12-10 | End: 2021-09-08

## 2019-12-18 DIAGNOSIS — Z00.00 ENCOUNTER FOR MEDICARE ANNUAL WELLNESS EXAM: ICD-10-CM

## 2019-12-18 DIAGNOSIS — R79.9 ABNORMAL BLOOD CHEMISTRY: ICD-10-CM

## 2019-12-18 LAB
CHOLEST SERPL-MCNC: 165 MG/DL
HDLC SERPL-MCNC: 47 MG/DL (ref 23–92)
LDLC SERPL CALC-MCNC: 105 MG/DL
NONHDLC SERPL-MCNC: 118 MG/DL
TRIGL SERPL-MCNC: 63 MG/DL

## 2019-12-18 PROCEDURE — 80061 LIPID PANEL: CPT | Mod: ZL | Performed by: FAMILY MEDICINE

## 2019-12-18 PROCEDURE — 36415 COLL VENOUS BLD VENIPUNCTURE: CPT | Mod: ZL | Performed by: FAMILY MEDICINE

## 2020-02-11 ENCOUNTER — HOSPITAL ENCOUNTER (OUTPATIENT)
Dept: MAMMOGRAPHY | Facility: OTHER | Age: 65
End: 2020-02-11
Attending: FAMILY MEDICINE
Payer: MEDICARE

## 2020-02-11 ENCOUNTER — HOSPITAL ENCOUNTER (OUTPATIENT)
Dept: BONE DENSITY | Facility: OTHER | Age: 65
Discharge: HOME | End: 2020-02-11
Attending: FAMILY MEDICINE | Admitting: FAMILY MEDICINE
Payer: MEDICARE

## 2020-02-11 DIAGNOSIS — Z12.31 ENCOUNTER FOR SCREENING MAMMOGRAM FOR BREAST CANCER: ICD-10-CM

## 2020-02-11 DIAGNOSIS — M89.9 DISORDER OF BONE AND CARTILAGE: ICD-10-CM

## 2020-02-11 DIAGNOSIS — Z78.0 POSTMENOPAUSAL STATUS: ICD-10-CM

## 2020-02-11 DIAGNOSIS — M94.9 DISORDER OF BONE AND CARTILAGE: ICD-10-CM

## 2020-02-11 DIAGNOSIS — Z00.00 ENCOUNTER FOR MEDICARE ANNUAL WELLNESS EXAM: ICD-10-CM

## 2020-02-11 PROCEDURE — 77080 DXA BONE DENSITY AXIAL: CPT

## 2020-02-11 PROCEDURE — 77067 SCR MAMMO BI INCL CAD: CPT

## 2020-02-12 ENCOUNTER — MYC MEDICAL ADVICE (OUTPATIENT)
Dept: FAMILY MEDICINE | Facility: OTHER | Age: 65
End: 2020-02-12

## 2020-02-17 ENCOUNTER — MYC MEDICAL ADVICE (OUTPATIENT)
Dept: FAMILY MEDICINE | Facility: OTHER | Age: 65
End: 2020-02-17

## 2020-02-17 NOTE — TELEPHONE ENCOUNTER
I do not see in the DEXA report where it says she has a bone cyst.  From where and from whom did she receive this note?

## 2020-02-24 NOTE — TELEPHONE ENCOUNTER
DEXA scan revealed osteopenia.  She should aim for Calcium intake of 1200 mg daily and Vitamin D intake of 800-1000 international unit(s) daily.  It is best to get this through her diet, but supplements may also be taken.  The National Osteoporosis Foundation website has a calcium calculator that she can use to estimate how much she is currently getting through her diet.  She should also participate in weight-bearing exercise.

## 2020-02-24 NOTE — TELEPHONE ENCOUNTER
After verifying patient's name and date of birth, patient was given the below information.  Norma J. Gosselin, LPN....2/24/2020 4:25 PM   released to [facility] also.

## 2020-09-02 ENCOUNTER — HOSPITAL ENCOUNTER (OUTPATIENT)
Dept: PHYSICAL THERAPY | Facility: OTHER | Age: 65
Setting detail: THERAPIES SERIES
End: 2020-09-02
Attending: NURSE PRACTITIONER
Payer: MEDICARE

## 2020-09-02 DIAGNOSIS — M76.822 LEFT TIBIALIS POSTERIOR TENDINITIS: ICD-10-CM

## 2020-09-02 PROCEDURE — 97035 APP MDLTY 1+ULTRASOUND EA 15: CPT | Mod: GP

## 2020-09-02 PROCEDURE — 97110 THERAPEUTIC EXERCISES: CPT | Mod: GP

## 2020-09-02 PROCEDURE — 97161 PT EVAL LOW COMPLEX 20 MIN: CPT | Mod: GP

## 2020-09-03 NOTE — PROGRESS NOTES
[facility]          OUTPATIENT PHYSICAL THERAPY ORTHOPEDIC EVALUATION  PLAN OF TREATMENT FOR OUTPATIENT REHABILITATION  (COMPLETE FOR INITIAL CLAIMS ONLY)  Patient's Last Name, First Name, M.I.  YOB: 1955  Ada Bal    Provider s Name:  [facility]   Medical Record No.  [id number]   Start of Care Date:  09/02/20   Onset Date:  (P) 08/25/20   Type:     _X__PT   ___OT   ___SLP Medical Diagnosis:  (P) Left posterior knee pain and capusule strain      PT Diagnosis:  (P) Left posterior knee pain and capusule strain    Visits from SOC:  1      _________________________________________________________________________________  Plan of Treatment/Functional Goals:  (P) joint mobilization, manual therapy, neuromuscular re-education, ROM, strengthening, stretching     (P) Ultrasound     Goals  Goal Identifier: (P) Pain   Goal Description: (P) Report no left knee pain when sitting with the left foot in a dependent position  Target Date: (P) 10/01/20    Goal Identifier: (P) Walking   Goal Description: (P) Return to walking 30 minutes for grocery shopping without left knee pain  Target Date: (P) 10/01/20    Goal Identifier: (P) Hiking   Goal Description: (P) Return to hiking on uneven terrain without pain or difficulty  Target Date: (P) 10/01/20                                                           Therapy Frequency:  (P) (8 visits )  Predicted Duration of Therapy Intervention:  (P) 4 weeks    Humza Leger, PT                 I CERTIFY THE NEED FOR THESE SERVICES FURNISHED UNDER        THIS PLAN OF TREATMENT AND WHILE UNDER MY CARE .             Physician Signature               Date    X_____________________________________________________                             Certification Date From:  (P) 09/02/20   Certification Date To:  (P) 10/01/20    Referring Provider:  Jacob Salazar NP    Initial Assessment        See Epic Evaluation Start of Care Date: 09/02/20

## 2020-09-03 NOTE — PROGRESS NOTES
"   09/02/20 0900   General Information   Type of Visit Initial OP Ortho PT Evaluation   Start of Care Date 09/02/20   Referring Physician Jacob Salazar NP   Patient/Family Goals Statement decrease pain    Orders Evaluate and Treat   Date of Order 08/28/20   Certification Required? Yes   Medical Diagnosis Left posterior knee pain and capusule strain    Surgical/Medical history reviewed Yes   Body Part(s)   Body Part(s) Knee   Presentation and Etiology   Pertinent history of current problem (include personal factors and/or comorbidities that impact the POC) Patient states she felt a \"pop\" behind the left knee when she rolled in bed on 8/25/2020.  States pain is localized to the posterior and medial knee primarily. States symptoms have improved with rest and heat/cold.    Impairments F. Decreased strength and endurance;H. Impaired gait;M. Locking or catching   Functional Limitations perform activities of daily living;perform desired leisure / sports activities   Onset date of current episode/exacerbation 08/25/20   Chronicity New   Pain rating (0-10 point scale) Best (/10);Worst (/10)   Best (/10) 1/10   Worst (/10) 3/10    Pain quality   (Described as a \"nuisance\" in moving, and sitting )   Frequency of pain/symptoms B. Intermittent   Pain/symptoms exacerbated by A. Sitting;B. Walking;K. Home tasks   Pain/symptoms eased by G. Heat;H. Cold;I. OTC medication(s);K. Other  (Tumeric )   Progression of symptoms since onset: Improved   Prior Level of Function   Prior Level of Function-Mobility No limitations   Prior Level of Function-ADLs No limitations    Current Level of Function   Current Community Support Family/friend caregiver   Patient role/employment history F. Retired   Fall Risk Screen   Fall screen completed by PT   Have you fallen 2 or more times in the past year? No   Have you fallen and had an injury in the past year? No   Is patient a fall risk? No   Knee Objective Findings   Observation No swelling noted  "   Gait/Locomotion Normal gait pattern   Knee ROM Comment Left knee ROM: 0-130-posterior/medial pain noted at end range,  Right knee ROM: 0-140   Knee/Hip Strength Comments Left knee MMT: Ext=4+/5, Flexion=4/5--pain over the medial HS    Varus Stress Test negative    Valgus Stress Test negative    Sudhir's Test negative    Palpation Pain over the semitendinosus and semimembranosus tendons   Planned Therapy Interventions   Planned Therapy Interventions joint mobilization;manual therapy;neuromuscular re-education;ROM;strengthening;stretching   Planned Modality Interventions   Planned Modality Interventions Ultrasound   Clinical Impression   Criteria for Skilled Therapeutic Interventions Met yes, treatment indicated   PT Diagnosis Left posterior knee pain and capusule strain    Influenced by the following impairments Pain, weakness, impaired ROM   Functional limitations due to impairments walking, hiking,    Clinical Presentation Stable/Uncomplicated   Clinical Presentation Rationale symptoms are consistent with the diagnosis    Clinical Decision Making (Complexity) Low complexity   Therapy Frequency   (8 visits )   Predicted Duration of Therapy Intervention (days/wks) 4 weeks   Risk & Benefits of therapy have been explained Yes   Patient, Family & other staff in agreement with plan of care Yes   Education Assessment   Preferred Learning Style Listening;Demonstration;Pictures/video   Barriers to Learning No barriers   ORTHO GOALS   PT Ortho Eval Goals 1;2;3   Ortho Goal 1   Goal Identifier Pain    Goal Description Report no left knee pain when sitting with the left foot in a dependent position   Target Date 10/01/20   Ortho Goal 2   Goal Identifier Walking    Goal Description Return to walking 30 minutes for grocery shopping without left knee pain   Target Date 10/01/20   Ortho Goal 3   Goal Identifier Hiking    Goal Description Return to hiking on uneven terrain without pain or difficulty   Target Date 10/01/20    Total Evaluation Time   PT Eval, Low Complexity Minutes (69916) 20   Therapy Certification   Certification date from 09/02/20   Certification date to 10/01/20   Medical Diagnosis Left posterior knee pain and capusule strain

## 2020-09-04 ENCOUNTER — HOSPITAL ENCOUNTER (OUTPATIENT)
Dept: PHYSICAL THERAPY | Facility: OTHER | Age: 65
Setting detail: THERAPIES SERIES
End: 2020-09-04
Attending: NURSE PRACTITIONER
Payer: MEDICARE

## 2020-09-04 PROCEDURE — 97110 THERAPEUTIC EXERCISES: CPT | Mod: GP,CQ

## 2020-09-04 PROCEDURE — 97035 APP MDLTY 1+ULTRASOUND EA 15: CPT | Mod: GP,CQ

## 2020-09-14 ENCOUNTER — HOSPITAL ENCOUNTER (OUTPATIENT)
Dept: PHYSICAL THERAPY | Facility: OTHER | Age: 65
Setting detail: THERAPIES SERIES
End: 2020-09-14
Attending: NURSE PRACTITIONER
Payer: MEDICARE

## 2020-09-14 PROCEDURE — 97110 THERAPEUTIC EXERCISES: CPT | Mod: GP,CQ

## 2020-09-14 PROCEDURE — 97035 APP MDLTY 1+ULTRASOUND EA 15: CPT | Mod: GP,CQ

## 2020-09-17 ENCOUNTER — HOSPITAL ENCOUNTER (OUTPATIENT)
Dept: PHYSICAL THERAPY | Facility: OTHER | Age: 65
Setting detail: THERAPIES SERIES
End: 2020-09-17
Attending: NURSE PRACTITIONER
Payer: MEDICARE

## 2020-09-17 PROCEDURE — 97140 MANUAL THERAPY 1/> REGIONS: CPT | Mod: GP,CQ

## 2020-09-17 PROCEDURE — 97035 APP MDLTY 1+ULTRASOUND EA 15: CPT | Mod: GP,CQ

## 2020-09-17 PROCEDURE — 97110 THERAPEUTIC EXERCISES: CPT | Mod: GP,CQ

## 2020-09-23 ENCOUNTER — HOSPITAL ENCOUNTER (OUTPATIENT)
Dept: PHYSICAL THERAPY | Facility: OTHER | Age: 65
Setting detail: THERAPIES SERIES
End: 2020-09-23
Attending: NURSE PRACTITIONER
Payer: MEDICARE

## 2020-09-23 PROCEDURE — 97140 MANUAL THERAPY 1/> REGIONS: CPT | Mod: GP

## 2020-09-23 PROCEDURE — 97035 APP MDLTY 1+ULTRASOUND EA 15: CPT | Mod: GP

## 2020-11-13 NOTE — PROGRESS NOTES
Outpatient Physical Therapy Discharge Note     Patient: Ada Bal  : 1955    Beginning/End Dates of Reporting Period:  2020 to 2020    Referring Provider: Jacob Salazar NP    Therapy Diagnosis: Left posterior knee pain and capusule strain      Client Self Report on 2020: Patient reports 95% improvement overall. She feels ready to begin a trial of independent HEP and management.     Patient did note require any additional follow up appointments after 2020.     Objective Measurements: Not assessed due to unplanned discharge     Goals:  Goal Identifier Pain    Goal Description Report no left knee pain when sitting with the left foot in a dependent position   Target Date 10/01/20   Date Met      Progress: Progressing at time of final visit      Goal Identifier Walking    Goal Description Return to walking 30 minutes for grocery shopping without left knee pain   Target Date 10/01/20   Date Met      Progress: Progressing at time of final visit      Goal Identifier Hiking    Goal Description Return to hiking on uneven terrain without pain or difficulty   Target Date 10/01/20   Date Met      Progress: Progressing at time of final visit        Plan:  Discharge from therapy.    Discharge:    Reason for Discharge: Patient chooses to discontinue therapy.    Discharge Plan: Patient to continue home program.   No

## 2020-12-27 ENCOUNTER — HEALTH MAINTENANCE LETTER (OUTPATIENT)
Age: 65
End: 2020-12-27

## 2021-01-15 ENCOUNTER — HEALTH MAINTENANCE LETTER (OUTPATIENT)
Age: 66
End: 2021-01-15

## 2021-09-08 ENCOUNTER — OFFICE VISIT (OUTPATIENT)
Dept: INTERNAL MEDICINE | Facility: OTHER | Age: 66
End: 2021-09-08
Attending: NURSE PRACTITIONER
Payer: COMMERCIAL

## 2021-09-08 VITALS
OXYGEN SATURATION: 100 % | HEIGHT: 63 IN | WEIGHT: 157.4 LBS | DIASTOLIC BLOOD PRESSURE: 72 MMHG | TEMPERATURE: 97 F | BODY MASS INDEX: 27.89 KG/M2 | SYSTOLIC BLOOD PRESSURE: 126 MMHG | HEART RATE: 63 BPM | RESPIRATION RATE: 16 BRPM

## 2021-09-08 DIAGNOSIS — Z12.31 ENCOUNTER FOR SCREENING MAMMOGRAM FOR BREAST CANCER: ICD-10-CM

## 2021-09-08 DIAGNOSIS — Z11.59 NEED FOR HEPATITIS C SCREENING TEST: ICD-10-CM

## 2021-09-08 DIAGNOSIS — Z12.11 SPECIAL SCREENING FOR MALIGNANT NEOPLASM OF COLON: ICD-10-CM

## 2021-09-08 DIAGNOSIS — Z23 NEED FOR PNEUMOCOCCAL VACCINE: ICD-10-CM

## 2021-09-08 DIAGNOSIS — Z00.00 MEDICARE ANNUAL WELLNESS VISIT, INITIAL: Primary | ICD-10-CM

## 2021-09-08 DIAGNOSIS — L81.4 LENTIGO: ICD-10-CM

## 2021-09-08 DIAGNOSIS — M85.80 OSTEOPENIA, UNSPECIFIED LOCATION: ICD-10-CM

## 2021-09-08 PROCEDURE — G0009 ADMIN PNEUMOCOCCAL VACCINE: HCPCS

## 2021-09-08 PROCEDURE — 90732 PPSV23 VACC 2 YRS+ SUBQ/IM: CPT

## 2021-09-08 PROCEDURE — G0438 PPPS, INITIAL VISIT: HCPCS | Performed by: NURSE PRACTITIONER

## 2021-09-08 RX ORDER — CHOLECALCIFEROL (VITAMIN D3) 50 MCG
1 TABLET ORAL DAILY
Start: 2021-09-08

## 2021-09-08 ASSESSMENT — ENCOUNTER SYMPTOMS
VOMITING: 0
CONSTIPATION: 0
BLOOD IN STOOL: 0
CHEST TIGHTNESS: 0
HEMATURIA: 0
TROUBLE SWALLOWING: 0
ARTHRALGIAS: 0
DIZZINESS: 0
SHORTNESS OF BREATH: 0
DYSURIA: 0
DIARRHEA: 0
UNEXPECTED WEIGHT CHANGE: 0
ADENOPATHY: 0
SORE THROAT: 0
LIGHT-HEADEDNESS: 0
ABDOMINAL PAIN: 0
NAUSEA: 0
PALPITATIONS: 0
EYE PAIN: 0
NERVOUS/ANXIOUS: 0
FATIGUE: 0
MYALGIAS: 0
COLOR CHANGE: 0
DYSPHORIC MOOD: 0
COUGH: 0
FEVER: 0
ANAL BLEEDING: 0
APNEA: 0

## 2021-09-08 ASSESSMENT — PAIN SCALES - GENERAL: PAINLEVEL: NO PAIN (0)

## 2021-09-08 ASSESSMENT — MIFFLIN-ST. JEOR: SCORE: 1211.7

## 2021-09-08 NOTE — NURSING NOTE
Immunization Documentation    Prior to Immunization administration, verified patients identity using patient's name and date of birth. Please see IMMUNIZATIONS  and order for additional information.  Patient / Parent instructed to remain in clinic for 15 minutes and report any adverse reaction to staff immediately.    Was the entire amount of vaccines given used? Yes    Nicole Kinney LPN  9/8/2021   9:14 AM

## 2021-09-08 NOTE — PROGRESS NOTES
"Medicare Wellness Visit   Ms. Bal is a 66 year old female who presents today who presents for Preventive Visit.      Are you in the first 12 months of your Medicare coverage?  No    Health Risk Assessment     Do you feel safe in your environment? Yes    Physical Health:    In general, how would you rate your overall physical health? excellent    Outside of work, how many days during the week do you exercise? 4-5 days/week    Outside of work, approximately how many minutes a day do you exercise?30-45 minutes    If you drink alcohol do you typically have >3 drinks per day or >7 drinks per week? No    Do you usually eat at least 4 servings of fruit and vegetables a day, include whole grains & fiber and avoid regularly eating high fat or \"junk\" foods? Yes    Do you have any problems taking medications regularly?  No    Do you have any side effects from medications? none    Needs assistance for the following daily activities: no assistance needed    Which of the following safety concerns are present in your home?  none identified     Hearing impairment: No    In the past 6 months, have you been bothered by leaking of urine? no      Screening     Fall risk  Fallen 2 or more times in the past year?: No  Any fall with injury in the past year?: No    Cognitive Screening   1) Repeat 3 items (Leader, Season, Table)    2) Clock draw: NORMAL  3) 3 item recall: Recalls 2 objects   Results: NORMAL clock, 1-2 items recalled: COGNITIVE IMPAIRMENT LESS LIKELY    Mini-CogTM Copyright ANAIS Botello. Licensed by the author for use in [facility]; reprinted with permission (nicol@.[email or web address]). All rights reserved.      Do you have sleep apnea, excessive snoring or daytime drowsiness?: no    Breast cancer screening: Due for one in 02/2022    Regular dental visits: April 2021    Eye exam with in 2 years: April 2021      End of Life Planning     Have you ever done Advance Care Planning? (For example, a Health Directive, POLST, or a " discussion with a medical provider or your loved ones about your wishes): Yes, patient states has an Advance Care Planning document and will bring a copy to the clinic.      End of Life Planning:  Patient currently has an advanced directive: Yes.  Practitioner is supportive of decision.        Medical Record Update     Reviewed and updated as needed this visit by clinical staff  Tobacco  Allergies  Meds  Med Hx  Surg Hx  Fam Hx  Soc Hx      Reviewed and updated as needed this visit by Provider  Tobacco  Allergies  Meds  Problems  Med Hx  Surg Hx  Fam Hx          Current Outpatient Medications   Medication     aspirin EC 81 MG EC tablet     fish oil-omega-3 fatty acids 1000 MG capsule     vitamin D3 (CHOLECALCIFEROL) 50 mcg (2000 units) tablet     Echinacea 500 MG CAPS     No current facility-administered medications for this visit.          Current providers sharing in care for this patient include:   Patient Care Team:  Tahira Narayan DO as PCP - General (Family Practice)  Tahira Narayan DO as Assigned PCP     Subjective:   Patient is here today for initial Medicare annual wellness visit.  She has a previous history of osteopenia as seen on bone density scan in 2020.  She gets enough calcium in her diet and takes vitamin D supplementation 1000 international units daily.  She walks 2 miles 4 days weekly.  Last mammogram in 2020.  She has never had colon cancer screening.  No family history and currently no symptoms.  She has never been tested for hepatitis C but low risk.  She declines Covid vaccination but would be interested in Pneumovax 23.  She had pneumonia a few years ago.  She declines influenza vaccine.  She needs to check with some family members and reviewing journals to determine if she had chickenpox as a child.  She believes that she did.  She would possibly be interested in Shingrix and Tdap which she could get at local pharmacy.      Review of Systems   Constitutional: Negative for  "fatigue, fever and unexpected weight change.   HENT: Negative for ear pain, mouth sores, sore throat and trouble swallowing.    Eyes: Negative for pain and visual disturbance.   Respiratory: Negative for apnea, cough, chest tightness and shortness of breath.    Cardiovascular: Negative for chest pain, palpitations and leg swelling.   Gastrointestinal: Negative for abdominal pain, anal bleeding, blood in stool, constipation, diarrhea, nausea and vomiting.   Genitourinary: Negative for dysuria, hematuria and vaginal bleeding.   Musculoskeletal: Negative for arthralgias and myalgias.   Skin: Negative for color change and rash.        Brown spot on nose   Neurological: Negative for dizziness, syncope and light-headedness.   Hematological: Negative for adenopathy.   Psychiatric/Behavioral: Negative for dysphoric mood. The patient is not nervous/anxious.    All other systems reviewed and are negative.         OBJECTIVE:     Vitals:    09/08/21 0815   BP: 126/72   BP Location: Right arm   Patient Position: Sitting   Cuff Size: Adult Regular   Pulse: 63   Resp: 16   Temp: 97  F (36.1  C)   TempSrc: Tympanic   SpO2: 100%   Weight: 71.4 kg (157 lb 6.4 oz)   Height: 1.59 m (5' 2.6\")        Estimated body mass index is 28.24 kg/m  as calculated from the following:    Height as of this encounter: 1.59 m (5' 2.6\").    Weight as of this encounter: 71.4 kg (157 lb 6.4 oz).     Physical Exam  Pleasant female no acute distress.  Affect normal.  Alert and oriented x4.  Sclera nonicteric.  Conjunctiva noninflamed.  TMs clear.  Patient wearing facial mask secondary to pandemic but denies oral mucosal issues.  Neck supple and without adenopathy.  No thyromegaly.  No carotid bruits.  Lung fields clear to auscultation throughout.  Cardiovascular regular rate and rhythm with no murmur or S3 auscultated.  Abdomen soft and without masses, tenderness and organomegaly.  No abdominal bruits or pulsatile masses.  No axillary or inguinal " adenopathy.  Extremities without edema.  DP PT 2+.  Gait stable.  Functional range of motion of all extremities intact.  Tip of her nose does have a tan, flat nonirritated discoloration measuring less than 2 mm in diameter approximate.      Labs reviewed in EPIC    ASSESSMENT / PLAN:       ICD-10-CM    1. Medicare annual wellness visit, initial  Z00.00 CBC with platelets     Comprehensive metabolic panel     Lipid Profile     TSH     UA reflex to Microscopic   2. Osteopenia, unspecified location  M85.80 vitamin D3 (CHOLECALCIFEROL) 50 mcg (2000 units) tablet   3. Encounter for screening mammogram for breast cancer  Z12.31 MA Screening Digital Bilateral   4. Special screening for malignant neoplasm of colon  Z12.11 COLOGUARD(EXACT SCIENCES)   5. Need for hepatitis C screening test  Z11.59 Hepatitis C antibody   6. Need for pneumococcal vaccine  Z23 PNEUMOCOCCAL 23 VALENT   7. [place]  L81.4          Preventative Care Guidelines and Health Counseling     COUNSELING:  Reviewed preventive health counseling  Special attention given to:   Preventative screening  Regular exercise  Healthy diet/nutrition  Bladder control   Immunizations   Reviewed preventive health counseling, as reflected in patient instructions       Regular exercise       Healthy diet/nutrition       Vision screening       Hearing screening       Dental care       Fall risk prevention       Aspirin prophylaxis        Osteoporosis prevention/bone health       Colon cancer screening       Hepatitis C screening       Advanced Planning     126/72       Body mass index is 28.24 kg/m . Weight management plan: Discussed healthy diet and exercise guidelines        Appropriate preventive services were discussed with this patient, including applicable screening as appropriate for cardiovascular disease, diabetes, osteopenia/osteoporosis, and glaucoma.  As appropriate for age/gender, discussed screening for colorectal cancer, prostate cancer, breast cancer, and  cervical cancer. Checklist reviewing preventive services available has been given to the patient.    Reviewed patients plan of care and provided an AVS. The Basic Care Plan (routine screening as documented in Health Maintenance) for patient meets the Care Plan requirement. This Care Plan has been established and reviewed with the Patient and any available family members.    Counseling Resources:  ATP IV Guidelines  Pooled Cohorts Equation Calculator  Breast Cancer Risk Calculator  FRAX Risk Assessment  ICSI Preventive Guidelines  Dietary Guidelines for Americans, 2010  USDA's MyPlate  ASA Prophylaxis  Lung CA Screening    PLAN:  Recommend annual Medicare wellness visit.  Consider bone density scan in 2 years.  Mammogram is scheduled.  Cologuard order placed.  She is not fasting but will return for fasting labs including CBC, CHEM 13, TSH, hepatitis C and urinalysis.  Pneumovax 23 provided.  Consider Prevnar 13 next year.  She declines COVID-19 and influenza vaccines.   She may be interested in Shingrix and Tdap but will obtain at local pharmacy.  He did not have any further questions in regards to vaccinations.  I did recommend that she continue to monitor the area on her nose.  Appears to be a benign lesion at this time.  If this becomes dry, raised, scaly, progressive discoloration, growing larger or easily irritated then would recommend evaluation and treatment.    9/8/2021  8:18 AM  [facility]

## 2021-09-08 NOTE — PATIENT INSTRUCTIONS
Patient Education   Personalized Prevention Plan  You are due for the preventive services outlined below.  Your care team is available to assist you in scheduling these services.  If you have already completed any of these items, please share that information with your care team to update in your medical record.  Health Maintenance Due   Topic Date Due     COVID-19 Vaccine (1) Never done     Hepatitis C Screening  Never done     Flu Vaccine (1) Never done

## 2021-09-08 NOTE — NURSING NOTE
"Chief Complaint   Patient presents with     Medicare Visit     physical       FOOD SECURITY SCREENING QUESTIONS  Hunger Vital Signs:  Within the past 12 months we worried whether our food would run out before we got money to buy more. Never  Within the past 12 months the food we bought just didn't last and we didn't have money to get more. Never  Nicole Kinney LPN 9/8/2021 8:17 AM      Initial /72 (BP Location: Right arm, Patient Position: Sitting, Cuff Size: Adult Regular)   Pulse 63   Temp 97  F (36.1  C) (Tympanic)   Resp 16   Ht 1.59 m (5' 2.6\")   Wt 71.4 kg (157 lb 6.4 oz)   SpO2 100%   BMI 28.24 kg/m   Estimated body mass index is 28.24 kg/m  as calculated from the following:    Height as of this encounter: 1.59 m (5' 2.6\").    Weight as of this encounter: 71.4 kg (157 lb 6.4 oz).  Medication Reconciliation: complete    Nicole Kinney LPN  "

## 2021-09-16 ENCOUNTER — LAB (OUTPATIENT)
Dept: LAB | Facility: OTHER | Age: 66
End: 2021-09-16
Attending: NURSE PRACTITIONER
Payer: MEDICARE

## 2021-09-16 DIAGNOSIS — Z11.59 NEED FOR HEPATITIS C SCREENING TEST: ICD-10-CM

## 2021-09-16 DIAGNOSIS — E83.52 HYPERCALCEMIA: Primary | ICD-10-CM

## 2021-09-16 DIAGNOSIS — E83.52 HYPERCALCEMIA: ICD-10-CM

## 2021-09-16 DIAGNOSIS — Z00.00 MEDICARE ANNUAL WELLNESS VISIT, INITIAL: ICD-10-CM

## 2021-09-16 LAB
ALBUMIN SERPL-MCNC: 4.4 G/DL (ref 3.5–5.7)
ALBUMIN UR-MCNC: NEGATIVE MG/DL
ALP SERPL-CCNC: 109 U/L (ref 34–104)
ALT SERPL W P-5'-P-CCNC: 14 U/L (ref 7–52)
ANION GAP SERPL CALCULATED.3IONS-SCNC: 7 MMOL/L (ref 3–14)
APPEARANCE UR: CLEAR
AST SERPL W P-5'-P-CCNC: 16 U/L (ref 13–39)
BILIRUB SERPL-MCNC: 0.5 MG/DL (ref 0.3–1)
BILIRUB UR QL STRIP: NEGATIVE
BUN SERPL-MCNC: 19 MG/DL (ref 7–25)
CALCIUM SERPL-MCNC: 11.6 MG/DL (ref 8.6–10.3)
CHLORIDE BLD-SCNC: 102 MMOL/L (ref 98–107)
CHOLEST SERPL-MCNC: 165 MG/DL
CO2 SERPL-SCNC: 30 MMOL/L (ref 21–31)
COLOR UR AUTO: NORMAL
CREAT SERPL-MCNC: 0.9 MG/DL (ref 0.6–1.2)
DEPRECATED CALCIDIOL+CALCIFEROL SERPL-MC: 29 UG/L (ref 30–100)
ERYTHROCYTE [DISTWIDTH] IN BLOOD BY AUTOMATED COUNT: 12.1 % (ref 10–15)
FASTING STATUS PATIENT QL REPORTED: YES
GFR SERPL CREATININE-BSD FRML MDRD: 66 ML/MIN/1.73M2
GLUCOSE BLD-MCNC: 92 MG/DL (ref 70–105)
GLUCOSE UR STRIP-MCNC: NEGATIVE MG/DL
HCT VFR BLD AUTO: 43.7 % (ref 35–47)
HDLC SERPL-MCNC: 47 MG/DL (ref 23–92)
HGB BLD-MCNC: 14.5 G/DL (ref 11.7–15.7)
HGB UR QL STRIP: NEGATIVE
KETONES UR STRIP-MCNC: NEGATIVE MG/DL
LDLC SERPL CALC-MCNC: 98 MG/DL
LEUKOCYTE ESTERASE UR QL STRIP: NEGATIVE
MCH RBC QN AUTO: 31.4 PG (ref 26.5–33)
MCHC RBC AUTO-ENTMCNC: 33.2 G/DL (ref 31.5–36.5)
MCV RBC AUTO: 95 FL (ref 78–100)
NITRATE UR QL: NEGATIVE
NONHDLC SERPL-MCNC: 118 MG/DL
PH UR STRIP: 6 [PH] (ref 5–9)
PLATELET # BLD AUTO: 224 10E3/UL (ref 150–450)
POTASSIUM BLD-SCNC: 4.1 MMOL/L (ref 3.5–5.1)
PROT SERPL-MCNC: 7.6 G/DL (ref 6.4–8.9)
PTH-INTACT SERPL-MCNC: 132 PG/ML (ref 12–88)
RBC # BLD AUTO: 4.62 10E6/UL (ref 3.8–5.2)
SODIUM SERPL-SCNC: 139 MMOL/L (ref 134–144)
SP GR UR STRIP: 1.02 (ref 1–1.03)
TRIGL SERPL-MCNC: 102 MG/DL
TSH SERPL DL<=0.005 MIU/L-ACNC: 2.83 MU/L (ref 0.4–4)
UROBILINOGEN UR STRIP-MCNC: NORMAL MG/DL
WBC # BLD AUTO: 4.4 10E3/UL (ref 4–11)

## 2021-09-16 PROCEDURE — 81003 URINALYSIS AUTO W/O SCOPE: CPT | Mod: ZL

## 2021-09-16 PROCEDURE — 83970 ASSAY OF PARATHORMONE: CPT | Mod: ZL

## 2021-09-16 PROCEDURE — 80061 LIPID PANEL: CPT | Mod: ZL

## 2021-09-16 PROCEDURE — 84443 ASSAY THYROID STIM HORMONE: CPT | Mod: ZL

## 2021-09-16 PROCEDURE — 82306 VITAMIN D 25 HYDROXY: CPT | Mod: ZL

## 2021-09-16 PROCEDURE — 85027 COMPLETE CBC AUTOMATED: CPT | Mod: ZL

## 2021-09-16 PROCEDURE — 82040 ASSAY OF SERUM ALBUMIN: CPT | Mod: ZL

## 2021-09-16 PROCEDURE — 86803 HEPATITIS C AB TEST: CPT | Mod: ZL

## 2021-09-16 PROCEDURE — 36415 COLL VENOUS BLD VENIPUNCTURE: CPT | Mod: ZL

## 2021-09-17 LAB — HCV AB SERPL QL IA: NONREACTIVE

## 2021-09-20 DIAGNOSIS — E83.52 SERUM CALCIUM ELEVATED: Primary | ICD-10-CM

## 2021-09-20 DIAGNOSIS — E21.0 PRIMARY HYPERPARATHYROIDISM (H): ICD-10-CM

## 2021-10-07 ENCOUNTER — HOSPITAL ENCOUNTER (OUTPATIENT)
Dept: MAMMOGRAPHY | Facility: OTHER | Age: 66
Discharge: HOME | End: 2021-10-07
Attending: NURSE PRACTITIONER | Admitting: NURSE PRACTITIONER
Payer: MEDICARE

## 2021-10-07 DIAGNOSIS — Z12.31 ENCOUNTER FOR SCREENING MAMMOGRAM FOR BREAST CANCER: ICD-10-CM

## 2021-10-07 PROCEDURE — 77063 BREAST TOMOSYNTHESIS BI: CPT

## 2021-11-05 ENCOUNTER — HOSPITAL ENCOUNTER (OUTPATIENT)
Dept: BONE DENSITY | Facility: OTHER | Age: 66
Discharge: HOME | End: 2021-11-05
Attending: NURSE PRACTITIONER | Admitting: NURSE PRACTITIONER
Payer: MEDICARE

## 2021-11-05 DIAGNOSIS — E21.0 PRIMARY HYPERPARATHYROIDISM (H): ICD-10-CM

## 2021-11-05 DIAGNOSIS — E83.52 SERUM CALCIUM ELEVATED: ICD-10-CM

## 2021-11-05 PROCEDURE — 77080 DXA BONE DENSITY AXIAL: CPT

## 2021-11-08 ENCOUNTER — TELEPHONE (OUTPATIENT)
Dept: INTERNAL MEDICINE | Facility: OTHER | Age: 66
End: 2021-11-08
Payer: COMMERCIAL

## 2021-11-08 DIAGNOSIS — E83.52 HYPERCALCEMIA: ICD-10-CM

## 2021-11-08 DIAGNOSIS — E21.0 PRIMARY HYPERPARATHYROIDISM (H): Primary | ICD-10-CM

## 2021-11-08 DIAGNOSIS — M85.80 OSTEOPENIA, UNSPECIFIED LOCATION: ICD-10-CM

## 2021-11-10 ENCOUNTER — TELEPHONE (OUTPATIENT)
Dept: INTERNAL MEDICINE | Facility: OTHER | Age: 66
End: 2021-11-10
Payer: COMMERCIAL

## 2021-11-10 DIAGNOSIS — E21.0 PRIMARY HYPERPARATHYROIDISM (H): ICD-10-CM

## 2021-11-10 DIAGNOSIS — E83.52 HYPERCALCEMIA: Primary | ICD-10-CM

## 2021-11-10 NOTE — TELEPHONE ENCOUNTER
Patient was contacted in regards to the endocrinology referral that was placed by Caroline.  She wasn't aware of it or sure if she needs it.  She stated that Caroline mentioned repeating her labs to check her calcium levels.  It does not appear that orders have been placed for this.  She would like her labs repeated first before moving forward with the endocrinology referral.  She would like to know if this is an okay plan and if lab orders need to be placed.  Izabel Jackson on 11/10/2021 at 4:46 PM

## 2021-11-11 NOTE — TELEPHONE ENCOUNTER
Patient contacted and informed of Caroline's response.     Carolina Ragsdale CMA on 11/11/2021 at 2:17 PM

## 2021-11-11 NOTE — TELEPHONE ENCOUNTER
This is a duplicate. Already spoke with patient.     Carolina Ragsdale, MARIAELENA on 11/11/2021 at 2:48 PM

## 2021-11-18 ENCOUNTER — LAB (OUTPATIENT)
Dept: LAB | Facility: OTHER | Age: 66
End: 2021-11-18
Attending: FAMILY MEDICINE
Payer: MEDICARE

## 2021-11-18 DIAGNOSIS — E83.52 HYPERCALCEMIA: ICD-10-CM

## 2021-11-18 DIAGNOSIS — E21.0 PRIMARY HYPERPARATHYROIDISM (H): ICD-10-CM

## 2021-11-18 LAB — CALCIUM SERPL-MCNC: 11.2 MG/DL (ref 8.6–10.3)

## 2021-11-18 PROCEDURE — 36415 COLL VENOUS BLD VENIPUNCTURE: CPT | Mod: ZL

## 2021-11-18 PROCEDURE — 82310 ASSAY OF CALCIUM: CPT | Mod: ZL

## 2021-12-02 ENCOUNTER — TELEPHONE (OUTPATIENT)
Dept: FAMILY MEDICINE | Facility: OTHER | Age: 66
End: 2021-12-02
Payer: COMMERCIAL

## 2021-12-02 NOTE — TELEPHONE ENCOUNTER
Has not heard from the Endrocrinologist, did referral in November, can you please call to update status or let her know who she should follow up with, she would prefer to go to [place],thank you!    Nohemy Morales on 12/2/2021 at 10:56 AM

## 2022-04-25 ENCOUNTER — OFFICE VISIT (OUTPATIENT)
Dept: INTERNAL MEDICINE | Facility: OTHER | Age: 67
End: 2022-04-25
Attending: STUDENT IN AN ORGANIZED HEALTH CARE EDUCATION/TRAINING PROGRAM
Payer: MEDICARE

## 2022-04-25 VITALS
BODY MASS INDEX: 27.45 KG/M2 | TEMPERATURE: 97.6 F | OXYGEN SATURATION: 98 % | SYSTOLIC BLOOD PRESSURE: 134 MMHG | RESPIRATION RATE: 17 BRPM | DIASTOLIC BLOOD PRESSURE: 78 MMHG | WEIGHT: 153 LBS | HEART RATE: 63 BPM

## 2022-04-25 DIAGNOSIS — E21.0 PRIMARY HYPERPARATHYROIDISM (H): ICD-10-CM

## 2022-04-25 DIAGNOSIS — E83.52 HYPERCALCEMIA: ICD-10-CM

## 2022-04-25 DIAGNOSIS — Z13.220 LIPID SCREENING: ICD-10-CM

## 2022-04-25 DIAGNOSIS — Z13.220 ENCOUNTER FOR SCREENING FOR LIPID DISORDER: ICD-10-CM

## 2022-04-25 DIAGNOSIS — R47.01 APHASIA: Primary | ICD-10-CM

## 2022-04-25 DIAGNOSIS — Z83.430 FAMILY HISTORY OF ELEVATED LIPOPROTEIN(A): ICD-10-CM

## 2022-04-25 LAB
ALBUMIN SERPL-MCNC: 4.5 G/DL (ref 3.5–5.7)
ANION GAP SERPL CALCULATED.3IONS-SCNC: 5 MMOL/L (ref 3–14)
BUN SERPL-MCNC: 18 MG/DL (ref 7–25)
CALCIUM SERPL-MCNC: 11.5 MG/DL (ref 8.6–10.3)
CHLORIDE BLD-SCNC: 103 MMOL/L (ref 98–107)
CHOLEST SERPL-MCNC: 183 MG/DL
CO2 SERPL-SCNC: 31 MMOL/L (ref 21–31)
CREAT SERPL-MCNC: 0.86 MG/DL (ref 0.6–1.2)
FASTING STATUS PATIENT QL REPORTED: NO
GFR SERPL CREATININE-BSD FRML MDRD: 73 ML/MIN/1.73M2
GLUCOSE BLD-MCNC: 104 MG/DL (ref 70–105)
HDLC SERPL-MCNC: 41 MG/DL (ref 23–92)
LDLC SERPL CALC-MCNC: 105 MG/DL
NONHDLC SERPL-MCNC: 142 MG/DL
POTASSIUM BLD-SCNC: 4.1 MMOL/L (ref 3.5–5.1)
PTH-INTACT SERPL-MCNC: 92 PG/ML (ref 12–88)
SODIUM SERPL-SCNC: 139 MMOL/L (ref 134–144)
TRIGL SERPL-MCNC: 183 MG/DL

## 2022-04-25 PROCEDURE — 80061 LIPID PANEL: CPT | Mod: ZL | Performed by: STUDENT IN AN ORGANIZED HEALTH CARE EDUCATION/TRAINING PROGRAM

## 2022-04-25 PROCEDURE — G0463 HOSPITAL OUTPT CLINIC VISIT: HCPCS

## 2022-04-25 PROCEDURE — 99214 OFFICE O/P EST MOD 30 MIN: CPT | Performed by: STUDENT IN AN ORGANIZED HEALTH CARE EDUCATION/TRAINING PROGRAM

## 2022-04-25 PROCEDURE — 36415 COLL VENOUS BLD VENIPUNCTURE: CPT | Mod: ZL | Performed by: STUDENT IN AN ORGANIZED HEALTH CARE EDUCATION/TRAINING PROGRAM

## 2022-04-25 PROCEDURE — 80048 BASIC METABOLIC PNL TOTAL CA: CPT | Mod: ZL | Performed by: STUDENT IN AN ORGANIZED HEALTH CARE EDUCATION/TRAINING PROGRAM

## 2022-04-25 PROCEDURE — 83970 ASSAY OF PARATHORMONE: CPT | Mod: ZL | Performed by: STUDENT IN AN ORGANIZED HEALTH CARE EDUCATION/TRAINING PROGRAM

## 2022-04-25 PROCEDURE — 82040 ASSAY OF SERUM ALBUMIN: CPT | Mod: ZL | Performed by: STUDENT IN AN ORGANIZED HEALTH CARE EDUCATION/TRAINING PROGRAM

## 2022-04-25 ASSESSMENT — PAIN SCALES - GENERAL: PAINLEVEL: NO PAIN (0)

## 2022-04-25 NOTE — NURSING NOTE
"Chief Complaint   Patient presents with     RECHECK       Initial /78   Pulse 63   Temp 97.6  F (36.4  C) (Tympanic)   Resp 17   Wt 69.4 kg (153 lb)   SpO2 98%   BMI 27.45 kg/m   Estimated body mass index is 27.45 kg/m  as calculated from the following:    Height as of 9/8/21: 1.59 m (5' 2.6\").    Weight as of this encounter: 69.4 kg (153 lb).  Medication Reconciliation: complete    FOOD SECURITY SCREENING QUESTIONS  Hunger Vital Signs:  Within the past 12 months we worried whether our food would run out before we got money to buy more. Never  Within the past 12 months the food we bought just didn't last and we didn't have money to get more. Never  Brittany Quinn LPN 4/25/2022 1:34 PM             Brittany Quinn LPN  "

## 2022-04-25 NOTE — LETTER
April 25, 2022      Ada Bal  [address] [place], [place] [address]  [facility] [address]        Dear ,    We are writing to inform you of your test results.    Your calcium and parathyroid hormone remain elevated.  I have placed a referral for endocrinology for further evaluation.  They will contact you to schedule this appointment either in [place] or the [facility].  Your preference when they call you to help schedule this.  Your cholesterol and triglycerides are mildly elevated.  This does not warrant a cholesterol pill in itself but if you had a TIA we certainly will start a cholesterol pill at that time.  We will talk after your MRI results return.    Resulted Orders   PTH, Intact   Result Value Ref Range    Parathyroid Hormone Intact 92 (H) 12 - 88 pg/mL   Basic Metabolic Panel   Result Value Ref Range    Sodium 139 134 - 144 mmol/L    Potassium 4.1 3.5 - 5.1 mmol/L    Chloride 103 98 - 107 mmol/L    Carbon Dioxide (CO2) 31 21 - 31 mmol/L    Anion Gap 5 3 - 14 mmol/L    Urea Nitrogen 18 7 - 25 mg/dL    Creatinine 0.86 0.60 - 1.20 mg/dL    Calcium 11.5 (H) 8.6 - 10.3 mg/dL    Glucose 104 70 - 105 mg/dL    GFR Estimate 73 >60 mL/min/1.73m2      Comment:      Effective December 21, 2021 eGFRcr in adults is calculated using the 2021 CKD-EPI creatinine equation which includes age and gender (Dary hull al., NEJ, DOI: 10.1056/YBLRwz2494815)   Albumin   Result Value Ref Range    Albumin 4.5 3.5 - 5.7 g/dL   Lipid Panel   Result Value Ref Range    Cholesterol 183 <200 mg/dL    Triglycerides 183 (H) <150 mg/dL    Direct Measure HDL 41 23 - 92 mg/dL    LDL Cholesterol Calculated 105 (H) <=100 mg/dL    Non HDL Cholesterol 142 (H) <130 mg/dL    Patient Fasting > 8hrs? No     Narrative    Cholesterol  Desirable:  <200 mg/dL    Triglycerides  Normal:  Less than 150 mg/dL  Borderline High:  150-199 mg/dL  High:  200-499 mg/dL  Very High:  Greater than or equal to 500 mg/dL    Direct Measure HDL  Female:  Greater than  or equal to 50 mg/dL   Male:  Greater than or equal to 40 mg/dL    LDL Cholesterol  Desirable:  <100mg/dL  Above Desirable:  100-129 mg/dL   Borderline High:  130-159 mg/dL   High:  160-189 mg/dL   Very High:  >= 190 mg/dL    Non HDL Cholesterol  Desirable:  130 mg/dL  Above Desirable:  130-159 mg/dL  Borderline High:  160-189 mg/dL  High:  190-219 mg/dL  Very High:  Greater than or equal to 220 mg/dL       If you have any questions or concerns, please call the clinic at the number listed above.       Sincerely,      Deion Pedraza MD

## 2022-04-25 NOTE — PROGRESS NOTES
Answers for HPI/ROS submitted by the patient on 4/25/2022  How many servings of fruits and vegetables do you eat daily?: 4 or more  On average, how many sweetened beverages do you drink each day (Examples: soda, juice, sweet tea, etc.  Do NOT count diet or artificially sweetened beverages)?: 0  How many minutes a day do you exercise enough to make your heart beat faster?: 10 to 19  How many days a week do you exercise enough to make your heart beat faster?: 3 or less  How many days per week do you miss taking your medication?: 0  What is the reason for your visit today?: Eye migraines  When did your symptoms begin?: 3-7 days ago  What are your symptoms?: Eye migraines  How would you describe these symptoms?: Mild  Are your symptoms:: Improving  Have you had these symptoms before?: Yes  Have you tried or received treatment for these symptoms before?: No  Is there anything that makes you feel worse?: Eye migraines  Is there anything that makes you feel better?: Close my eyes and rest    {PROVIDER CHARTING PREFERENCE:691007}    Lennox Caballero is a 67 year old who presents for the following health issues {ACCOMPANIED BY STATEMENT (Optional):720419}    History of Present Illness       Reason for visit:  Eye migraines  Symptom onset:  3-7 days ago  Symptoms include:  Eye migraines  Symptom intensity:  Mild  Symptom progression:  Improving  Had these symptoms before:  Yes  Has tried/received treatment for these symptoms:  No  What makes it worse:  Eye migraines  What makes it better:  Close my eyes and rest    She eats 4 or more servings of fruits and vegetables daily.She consumes 0 sweetened beverage(s) daily.She exercises with enough effort to increase her heart rate 10 to 19 minutes per day.  She exercises with enough effort to increase her heart rate 3 or less days per week.   She is taking medications regularly.       Concern - of comprehending   Onset: 2 months, 2 episodes   Description: short time   Intensity:  mild  Progression of Symptoms:  worsening  Accompanying Signs & Symptoms: vision changes   Previous history of similar problem: migraines   Precipitating factors:        Worsened by: nothing   Alleviating factors:        Improved by: nothing   Therapies tried and outcome: None    {additonal problems for provider to add (Optional):830254}    Review of Systems   {ROS COMP (Optional):827885}      Objective    There were no vitals taken for this visit.  There is no height or weight on file to calculate BMI.  Physical Exam   {Exam List (Optional):026267}    {Diagnostic Test Results (Optional):661215}    {AMBULATORY ATTESTATION (Optional):108476}

## 2022-04-25 NOTE — PROGRESS NOTES
"Ms. Bal is a 67 year old female who presents to the clinic for neurologic concerns    History of Present Illness       Reason for visit:  Eye migraines  Symptom onset:  3-7 days ago  Symptoms include:  Eye migraines  Symptom intensity:  Mild  Symptom progression:  Improving  Had these symptoms before:  Yes  Has tried/received treatment for these symptoms:  No  What makes it worse:  Eye migraines  What makes it better:  Close my eyes and rest    She eats 4 or more servings of fruits and vegetables daily.She consumes 0 sweetened beverage(s) daily.She exercises with enough effort to increase her heart rate 10 to 19 minutes per day.  She exercises with enough effort to increase her heart rate 3 or less days per week.   She is taking medications regularly.        [id number] had episodes of comprehension with reading, MRI showed an ear issue.     Has history of occular migraines.     Was reading and could not figure out the words.   Mother had a history of TIAs.   Episode last Wednesday.   No difficulty forming words or coming up with sentences.   Massage on Friday.   Chiropractor 5 days prior   Episode lasted 10 minutes.   No blurred vision at that time.         Review of Systems     Reviewed and updated as needed this visit by Provider                     EXAM:   Vitals:    04/25/22 1333   BP: 134/78   Pulse: 63   Resp: 17   Temp: 97.6  F (36.4  C)   TempSrc: Tympanic   SpO2: 98%   Weight: 69.4 kg (153 lb)         BP Readings from Last 3 Encounters:   04/25/22 134/78   09/08/21 126/72   12/05/19 128/76      Wt Readings from Last 3 Encounters:   04/25/22 69.4 kg (153 lb)   09/08/21 71.4 kg (157 lb 6.4 oz)   12/05/19 68.7 kg (151 lb 6.4 oz)      Estimated body mass index is 27.45 kg/m  as calculated from the following:    Height as of 9/8/21: 1.59 m (5' 2.6\").    Weight as of this encounter: 69.4 kg (153 lb).     Physical Exam   General: Pleasant 67-year-old woman sitting clinic no acute distress  Neuro: Cranial nerves " II through XII intact, intact strength and sensation in upper and lower extremity bilaterally  CV: Regular rate rhythm no peripheral edema appreciated  Pulmonary: Clear to auscultation bilaterally        INVESTIGATIONS:  - Labs reviewed in [facility]     ASSESSMENT AND PLAN:    ICD-10-CM    1. Aphasia  R47.01 MR Brain w/o & w Contrast   2. Hypercalcemia  E83.52 PTH, Intact     Basic Metabolic Panel     Albumin     Lipid Panel     PTH, Intact     Basic Metabolic Panel     Albumin     Lipid Panel   3. Encounter for screening for lipid disorder  Z13.220 Lipid Panel     Lipid Panel   4. Lipid screening  Z13.220 Lipid Panel     Lipid Panel   5. Family history of elevated lipoprotein(a)   Z83.430 Lipid Panel     Lipid Panel   6. Primary hyperparathyroidism (H)  E21.0        Assessment/Plan:       Aphasia: Difficulty reading text unable to make out the words out of the blue.  Differential is ocular migraine versus CVA/TIA.  Certainly warrants urgent MRI.  Mother with history of multiple TIAs and hyperlipidemia.  Recommend she continue taking her baby aspirin check lipids today and start statin if clinically indicated.    Hypercalcemia: Previously diagnosed with hypercalcemia and elevated PTH recheck today and if still elevated will refer to endocrinology for further evaluation and treatment.        Follow-up with me after MRI      Electronically signed by:  Deion Pedraza MD on 4/25/2022  Internal Medicine  [facility]

## 2022-05-04 ENCOUNTER — HOSPITAL ENCOUNTER (OUTPATIENT)
Dept: MRI IMAGING | Facility: OTHER | Age: 67
Discharge: HOME | End: 2022-05-04
Attending: STUDENT IN AN ORGANIZED HEALTH CARE EDUCATION/TRAINING PROGRAM | Admitting: STUDENT IN AN ORGANIZED HEALTH CARE EDUCATION/TRAINING PROGRAM
Payer: MEDICARE

## 2022-05-04 DIAGNOSIS — R47.01 APHASIA: ICD-10-CM

## 2022-05-04 PROCEDURE — 70553 MRI BRAIN STEM W/O & W/DYE: CPT

## 2022-05-04 PROCEDURE — A9575 INJ GADOTERATE MEGLUMI 0.1ML: HCPCS | Performed by: STUDENT IN AN ORGANIZED HEALTH CARE EDUCATION/TRAINING PROGRAM

## 2022-05-04 PROCEDURE — 255N000002 HC RX 255 OP 636: Performed by: STUDENT IN AN ORGANIZED HEALTH CARE EDUCATION/TRAINING PROGRAM

## 2022-05-04 RX ADMIN — GADOTERATE MEGLUMINE 14 ML: 376.9 INJECTION INTRAVENOUS at 08:46

## 2022-05-16 ENCOUNTER — OFFICE VISIT (OUTPATIENT)
Dept: INTERNAL MEDICINE | Facility: OTHER | Age: 67
End: 2022-05-16
Attending: STUDENT IN AN ORGANIZED HEALTH CARE EDUCATION/TRAINING PROGRAM
Payer: COMMERCIAL

## 2022-05-16 VITALS
RESPIRATION RATE: 16 BRPM | SYSTOLIC BLOOD PRESSURE: 134 MMHG | HEART RATE: 80 BPM | HEIGHT: 63 IN | DIASTOLIC BLOOD PRESSURE: 68 MMHG | WEIGHT: 153 LBS | OXYGEN SATURATION: 95 % | BODY MASS INDEX: 27.11 KG/M2 | TEMPERATURE: 97.9 F

## 2022-05-16 DIAGNOSIS — I67.89 CEREBRAL MICROVASCULAR DISEASE: Primary | ICD-10-CM

## 2022-05-16 DIAGNOSIS — R30.0 DYSURIA: ICD-10-CM

## 2022-05-16 DIAGNOSIS — R47.01 APHASIA: ICD-10-CM

## 2022-05-16 DIAGNOSIS — E21.0 PRIMARY HYPERPARATHYROIDISM (H): ICD-10-CM

## 2022-05-16 PROCEDURE — G0463 HOSPITAL OUTPT CLINIC VISIT: HCPCS

## 2022-05-16 PROCEDURE — 99214 OFFICE O/P EST MOD 30 MIN: CPT | Performed by: STUDENT IN AN ORGANIZED HEALTH CARE EDUCATION/TRAINING PROGRAM

## 2022-05-16 ASSESSMENT — PAIN SCALES - GENERAL: PAINLEVEL: NO PAIN (0)

## 2022-05-16 NOTE — PROGRESS NOTES
"    Lennox Caballero is a 67 year old who presents for the following health issues     History of Present Illness       Reason for visit:  Review of MRI results  Symptoms include:  1 month ago  Symptom progression:  Improving  Had these symptoms before:  Yes  Has tried/received treatment for these symptoms:  Yes  Previous treatment was successful:  Yes  Prior treatment description:  Chiropractor  What makes it worse:  Ocular migraine s  What makes it better:  Resting eyes    She eats 4 or more servings of fruits and vegetables daily.She consumes 0 sweetened beverage(s) daily.She exercises with enough effort to increase her heart rate 60 or more minutes per day.  She exercises with enough effort to increase her heart rate 5 days per week.   She is taking medications regularly.       Since our last visit she has had no further episodes of aphasia/vision difficulties.  She has a history of ocular migraines.    Reviewed her MRI results.  Shows some microvascular changes on MRI.  No other vascular abnormalities.  Reviewed her lipid results and performed AHA calculation which shows a 8.1% risk of heart disease in the next 10 years.  Discussed statins with her.      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  She has an endocrinology appointment this Thursday.      Having dysuria at times and using over-the-counter cranberry juice wondering if there is anything else she can utilize to treat them without being evaluated.      Review of Systems         Objective    /68 (BP Location: Right arm, Patient Position: Sitting, Cuff Size: Adult Regular)   Pulse 80   Temp 97.9  F (36.6  C) (Temporal)   Resp 16   Ht 1.588 m (5' 2.5\")   Wt 69.4 kg (153 lb)   SpO2 95%   Breastfeeding No   BMI 27.54 kg/m    Body mass index is 27.54 kg/m .  Physical Exam   General: Pleasant 67-year-old woman sitting in clinic no acute distress    Reviewed and interpreted her prior MRI and lab results.      "     Assessment & Plan       ICD-10-CM    1. Cerebral microvascular disease  I67.89    2. Primary hyperparathyroidism (H)  E21.0    3. Aphasia  R47.01    4. Dysuria  R30.0        Cerebral microvascular disease, aphasia: Unsure if her aphasia was secondary to TIA versus ocular migraine and has subsequently resolved and not recurred.  Discussed with her risk of a heart attack in the next 10 years of 8.1% and microvascular changes recommend starting high intensity statin with atorvastatin or rosuvastatin.  She is very hesitant to start this due to intolerance and her .  She will contact me after further research if she would be willing to start a statin.    Hyperparathyroidism: Reviewed her calcium and PTH appears to be parathyroid mediated, has appointment with endocrinology in [place] Thursday.    Dysuria: Dysuria at times with some burning with urination using cranberry juice also recommend over-the-counter Azo and presenting in clinic for urine culture and antibiotics if symptoms not resolve.  No current symptoms today.        No follow-ups on file.    Deion Pedraza MD  [facility]

## 2022-05-16 NOTE — NURSING NOTE
"Chief Complaint   Patient presents with     RECHECK     3 wk follow up regarding MRI results       Medication reconciliation completed.    FOOD SECURITY SCREENING QUESTIONS:    The next two questions are to help us understand your food security.  If you are feeling you need any assistance in this area, we have resources available to support you today.    Hunger Vital Signs:  Within the past 12 months we worried whether our food would run out before we got money to buy more. Never  Within the past 12 months the food we bought just didn't last and we didn't have money to get more. Never    Initial /68 (BP Location: Right arm, Patient Position: Sitting, Cuff Size: Adult Regular)   Pulse 80   Temp 97.9  F (36.6  C) (Temporal)   Resp 16   Ht 1.588 m (5' 2.5\")   Wt 69.4 kg (153 lb)   SpO2 95%   Breastfeeding No   BMI 27.54 kg/m   Estimated body mass index is 27.54 kg/m  as calculated from the following:    Height as of this encounter: 1.588 m (5' 2.5\").    Weight as of this encounter: 69.4 kg (153 lb).       Jacinda Salazar LPN .......  5/16/2022  11:10 AM  "

## 2022-05-19 ENCOUNTER — TRANSFERRED RECORDS (OUTPATIENT)
Dept: HEALTH INFORMATION MANAGEMENT | Facility: OTHER | Age: 67
End: 2022-05-19
Payer: COMMERCIAL

## 2022-05-25 ENCOUNTER — DOCUMENTATION ONLY (OUTPATIENT)
Dept: OTHER | Facility: CLINIC | Age: 67
End: 2022-05-25
Payer: COMMERCIAL

## 2022-06-14 ENCOUNTER — OFFICE VISIT (OUTPATIENT)
Dept: FAMILY MEDICINE | Facility: OTHER | Age: 67
End: 2022-06-14
Attending: PHYSICIAN ASSISTANT
Payer: COMMERCIAL

## 2022-06-14 VITALS
OXYGEN SATURATION: 97 % | WEIGHT: 152.2 LBS | BODY MASS INDEX: 28.01 KG/M2 | DIASTOLIC BLOOD PRESSURE: 72 MMHG | SYSTOLIC BLOOD PRESSURE: 122 MMHG | TEMPERATURE: 97.4 F | HEIGHT: 62 IN | RESPIRATION RATE: 16 BRPM | HEART RATE: 80 BPM

## 2022-06-14 DIAGNOSIS — Z87.440 PERSONAL HISTORY OF URINARY TRACT INFECTION: ICD-10-CM

## 2022-06-14 DIAGNOSIS — N39.9 URINARY PROBLEM IN FEMALE: Primary | ICD-10-CM

## 2022-06-14 LAB
ALBUMIN UR-MCNC: NEGATIVE MG/DL
APPEARANCE UR: CLEAR
BILIRUB UR QL STRIP: NEGATIVE
COLOR UR AUTO: ABNORMAL
GLUCOSE UR STRIP-MCNC: NEGATIVE MG/DL
HGB UR QL STRIP: NEGATIVE
KETONES UR STRIP-MCNC: NEGATIVE MG/DL
LEUKOCYTE ESTERASE UR QL STRIP: ABNORMAL
MUCOUS THREADS #/AREA URNS LPF: PRESENT /LPF
NITRATE UR QL: NEGATIVE
PH UR STRIP: 6 [PH] (ref 5–9)
RBC URINE: 1 /HPF
SP GR UR STRIP: 1.01 (ref 1–1.03)
UROBILINOGEN UR STRIP-MCNC: NORMAL MG/DL
WBC URINE: 17 /HPF

## 2022-06-14 PROCEDURE — G0463 HOSPITAL OUTPT CLINIC VISIT: HCPCS

## 2022-06-14 PROCEDURE — 99213 OFFICE O/P EST LOW 20 MIN: CPT | Performed by: NURSE PRACTITIONER

## 2022-06-14 PROCEDURE — 81001 URINALYSIS AUTO W/SCOPE: CPT | Mod: ZL

## 2022-06-14 PROCEDURE — 87086 URINE CULTURE/COLONY COUNT: CPT | Mod: ZL | Performed by: NURSE PRACTITIONER

## 2022-06-14 RX ORDER — CEPHALEXIN 500 MG/1
500 CAPSULE ORAL 2 TIMES DAILY
Qty: 14 CAPSULE | Refills: 0 | Status: SHIPPED | OUTPATIENT
Start: 2022-06-14 | End: 2022-06-21

## 2022-06-14 ASSESSMENT — PAIN SCALES - GENERAL: PAINLEVEL: NO PAIN (0)

## 2022-06-14 NOTE — PROGRESS NOTES
"HPI:    Ada Bal is a 67 year old female who presents to [facility] today for abdominal pressure. She is concerned for UTI today.  Takes azo, dealing with it for a few months, last Thursday she was having frequent urination. No fever or chills. No lower back pain. Going small amounts. No signs of blood. No hesitancy. No burning or pain. Saw a provider in May for this and was told to take AZO.        ROS:  Refer to HPI  Constitutional: Negative for fever or chills.   Ears: Negative  Eyes: Negative  Throat: Negative   Abdomen: tenderness over pubis. Denies constipation or diarrhea.   : Urgency and frequency. Negative for burning or hesitancy.     /72 (BP Location: Right arm, Patient Position: Sitting, Cuff Size: Adult Regular)   Pulse 80   Temp 97.4  F (36.3  C) (Tympanic)   Resp 16   Ht 1.575 m (5' 2\")   Wt 69 kg (152 lb 3.2 oz)   SpO2 97%   Breastfeeding No   BMI 27.84 kg/m      EXAM:  General Appearance: Well appearing female, appropriate appearance for age. No acute distress  Respiratory: normal chest wall and respirations.  Normal effort.  Clear to auscultation bilaterally, no wheezing, crackles or rhonchi.  No increased work of breathing.  No cough appreciated.  Cardiac: RRR with no murmurs  Musculoskeletal: Normal gait.    Dermatological: no rashes noted of exposed skin  Psychological: normal affect, alert, oriented, and pleasant.     Diagnostics:  Results for orders placed or performed in visit on 06/14/22   UA reflex to Microscopic and Culture     Status: Abnormal    Specimen: Urine, Midstream   Result Value Ref Range    Color Urine Light Yellow Colorless, Straw, Light Yellow, Yellow    Appearance Urine Clear Clear    Glucose Urine Negative Negative mg/dL    Bilirubin Urine Negative Negative    Ketones Urine Negative Negative mg/dL    Specific Gravity Urine 1.015 1.000 - 1.030    Blood Urine Negative Negative    pH Urine 6.0 5.0 - 9.0    Protein Albumin Urine Negative Negative mg/dL "    Urobilinogen Urine Normal Normal, 2.0 mg/dL    Nitrite Urine Negative Negative    Leukocyte Esterase Urine Moderate (A) Negative    Mucus Urine Present (A) None Seen /LPF    RBC Urine 1 <=2 /HPF    WBC Urine 17 (H) <=5 /HPF    Narrative    Urine Culture ordered based on laboratory criteria      [facility] [address].     ASSESSMENT/PLAN:  Ada was seen today for urinary problem.    Diagnoses and all orders for this visit:    Urinary problem in female  -     UA reflex to Microscopic positive for UTI. Last UTI was in 2017 and she was resistant to cipro and macrobid. Will treat with keflex until culture complete.   -     Urine Culture    Personal history of urinary tract infection  -     cephALEXin (KEFLEX) 500 MG capsule; Take 1 capsule (500 mg) by mouth 2 times daily for 7 days  - Based off UA results, patient does meet criteria for antibiotic therapy.  Patient placed on cephalexin.   [x] Will add on urine culture and adjust treatment as indicated per results of culture.  [x] Will treat with antibiotics; encouraged intake of probiotics/yogurt while on antibiotics and for 1 week after.  [x]  Educated on prevention including avoidance of bubble baths, douching, powders, lotions, scented pads   [x] Control sugars, keep working on this.  [x] Drink plenty of liquids, especially water. Drinking water helps dilute your urine and ensures that you'll urinate more frequently -- allowing bacteria to be flushed from your urinary tract before an infection can begin.  [x] Drink sugar free cranberry juice or take cranberry tabs to help prevent symptoms.  [x] Good yahaira-care which means wipe from front to back, doing so after urinating and after a bowel movement.  [x] Empty your bladder as soon as possible after intercourse (remember, wipe front to back).   [] Change your birth control method. Diaphragms, or unlubricated or spermicide-treated condoms, can all contribute to bacterial growth.  [x] Eat a  "yogurt/drink Kefir or take a probiotic daily while on antibiotics and for a week after.  [x] Return/call as needed for follow-up should any new symptoms develop, for worsening of current symptoms or if symptoms do not resolve with above plan.  [x] Recommend alternating Tylenol and ibuprofen every 4-6 hours as needed, warm heating pad, pushing fluids/hydration, cranberry juice/pills, urinating when urge arises.   [x] May also try over the counter remedies such as Azo (as long as pyridium not already prescribed). Patient aware that if they do take Azo, that this medication can change color of urine to an \"orange\" color.     Discussed warning signs/symptoms indicative of need to follow-up.    Follow up with PCP or ED if symptoms persist or worsen or concerns.    I explained my diagnostic considerations and recommendations to the patient, who voiced understanding and agreement with the treatment plan. All questions were answered. We discussed potential side effects of any prescribed or recommended therapies, as well as expectations for response to treatments.    Jono RIVAS [facility]      I was present with the nurse practitioner student who participated in the service and in the documentation of the note. I have verified the history and personally performed the physical exam and medical decision making. I agree with the assessment and plan of care as documented in the note.     SHELLIE Garcia, AGNP-C  [facility]   06/14/2022 8:28 PM    Total time spent with this patient was 20 minutes which included chart review, visualization and interpretation of labs/images, time spent with the patient and documentation.    Past Medical History:   Diagnosis Date     Encounter for full-term uncomplicated delivery     x2     Migraine without status migrainosus, not intractable     No Comments Provided     Premenstrual tension syndrome     anxiety and irritability     Past Surgical History:   Procedure " "Laterality Date     TONSILLECTOMY, ADENOIDECTOMY, COMBINED      1967     Social History     Tobacco Use     Smoking status: Never Smoker     Smokeless tobacco: Never Used   Substance Use Topics     Alcohol use: Yes     Comment: occassional     Current Outpatient Medications   Medication Sig Dispense Refill     aspirin EC 81 MG EC tablet Take 81 mg by mouth       fish oil-omega-3 fatty acids 1000 MG capsule Take 1 capsule by mouth       vitamin D3 (CHOLECALCIFEROL) 50 mcg (2000 units) tablet Take 1 tablet (50 mcg) by mouth daily       No Known Allergies    Past medical history, past surgical history, current medications and allergies reviewed and accurate to the best of my knowledge.      Nursing Notes:   Anahi Rees LPN  6/14/2022  2:22 PM  Signed  Patient presents to the clinic today with urinary frequency and pressure that started on Thursday.  Anahi Rees LPN 6/14/2022   2:15 PM    Chief Complaint   Patient presents with     Urinary Problem       Initial /72 (BP Location: Right arm, Patient Position: Sitting, Cuff Size: Adult Regular)   Pulse 80   Temp 97.4  F (36.3  C) (Tympanic)   Resp 16   Ht 1.575 m (5' 2\")   Wt 69 kg (152 lb 3.2 oz)   SpO2 97%   Breastfeeding No   BMI 27.84 kg/m   Estimated body mass index is 27.84 kg/m  as calculated from the following:    Height as of this encounter: 1.575 m (5' 2\").    Weight as of this encounter: 69 kg (152 lb 3.2 oz).  Medication Reconciliation: complete  Anahi Rees LPN      "

## 2022-06-14 NOTE — NURSING NOTE
"Patient presents to the clinic today with urinary frequency and pressure that started on Thursday.  Anahi Rees LPN 6/14/2022   2:15 PM    Chief Complaint   Patient presents with     Urinary Problem       Initial /72 (BP Location: Right arm, Patient Position: Sitting, Cuff Size: Adult Regular)   Pulse 80   Temp 97.4  F (36.3  C) (Tympanic)   Resp 16   Ht 1.575 m (5' 2\")   Wt 69 kg (152 lb 3.2 oz)   SpO2 97%   Breastfeeding No   BMI 27.84 kg/m   Estimated body mass index is 27.84 kg/m  as calculated from the following:    Height as of this encounter: 1.575 m (5' 2\").    Weight as of this encounter: 69 kg (152 lb 3.2 oz).  Medication Reconciliation: complete  Anahi Rees LPN    "

## 2022-06-16 LAB — BACTERIA UR CULT: ABNORMAL

## 2022-10-11 ENCOUNTER — HOSPITAL ENCOUNTER (OUTPATIENT)
Dept: MAMMOGRAPHY | Facility: OTHER | Age: 67
Discharge: HOME | End: 2022-10-11
Attending: FAMILY MEDICINE | Admitting: FAMILY MEDICINE
Payer: MEDICARE

## 2022-10-11 DIAGNOSIS — Z12.31 VISIT FOR SCREENING MAMMOGRAM: ICD-10-CM

## 2022-10-11 PROCEDURE — 77067 SCR MAMMO BI INCL CAD: CPT

## 2022-11-10 ENCOUNTER — OFFICE VISIT (OUTPATIENT)
Dept: FAMILY MEDICINE | Facility: OTHER | Age: 67
End: 2022-11-10
Attending: FAMILY MEDICINE
Payer: COMMERCIAL

## 2022-11-10 VITALS
SYSTOLIC BLOOD PRESSURE: 118 MMHG | OXYGEN SATURATION: 97 % | HEIGHT: 63 IN | RESPIRATION RATE: 18 BRPM | TEMPERATURE: 98.1 F | WEIGHT: 154.6 LBS | HEART RATE: 87 BPM | DIASTOLIC BLOOD PRESSURE: 77 MMHG | BODY MASS INDEX: 27.39 KG/M2

## 2022-11-10 DIAGNOSIS — M54.2 NECK PAIN: ICD-10-CM

## 2022-11-10 DIAGNOSIS — Z00.00 ENCOUNTER FOR MEDICARE ANNUAL WELLNESS EXAM: Primary | ICD-10-CM

## 2022-11-10 DIAGNOSIS — Z12.11 SCREEN FOR COLON CANCER: ICD-10-CM

## 2022-11-10 DIAGNOSIS — Z13.6 SCREENING FOR AAA (AORTIC ABDOMINAL ANEURYSM): ICD-10-CM

## 2022-11-10 PROCEDURE — G0439 PPPS, SUBSEQ VISIT: HCPCS | Performed by: FAMILY MEDICINE

## 2022-11-10 RX ORDER — METHOCARBAMOL 500 MG/1
500 TABLET, FILM COATED ORAL 3 TIMES DAILY PRN
Qty: 30 TABLET | Refills: 11 | Status: SHIPPED | OUTPATIENT
Start: 2022-11-10 | End: 2022-11-11

## 2022-11-10 ASSESSMENT — ENCOUNTER SYMPTOMS
HEADACHES: 0
ARTHRALGIAS: 0
NERVOUS/ANXIOUS: 0
SHORTNESS OF BREATH: 0
WEAKNESS: 0
COUGH: 0
SORE THROAT: 0
ABDOMINAL PAIN: 0
CHILLS: 0
DIZZINESS: 0
HEMATOCHEZIA: 0
DIARRHEA: 0
HEMATURIA: 0
HEARTBURN: 0
PARESTHESIAS: 0
FREQUENCY: 0
DYSURIA: 0
JOINT SWELLING: 0
FEVER: 0
NAUSEA: 0
CONSTIPATION: 0
PALPITATIONS: 0
EYE PAIN: 0
BREAST MASS: 0
MYALGIAS: 0

## 2022-11-10 ASSESSMENT — PAIN SCALES - GENERAL: PAINLEVEL: NO PAIN (0)

## 2022-11-10 ASSESSMENT — ACTIVITIES OF DAILY LIVING (ADL): CURRENT_FUNCTION: NO ASSISTANCE NEEDED

## 2022-11-10 NOTE — PATIENT INSTRUCTIONS
Cologuard Patient Instructions    You received an order for a Cologuard test. You will receive your kit in the mail. Please follow the instructions that are provided in the kit.      Reminder: Ship Cologuard test the same day or the next day to allow enough delivery time. The lab must receive your specimen within 4 days for successful testing. If not delivered in time, you may have to complete the process again.    Home Pick-up:  Once you complete the kit, [facility] at [phone] and they will schedule a UPS pickup for you.    OR    Drop Off Locations:  Once you have completed the collection and have it ready to be shipped, bring it to one of the following sites listed below. *Note: none of the sites ship out later than mid-morning. Please do not drop off Fridays, as they will not go out until Monday which will make your specimen inacceptable.     - [facility] ([address], [place] [address])      Drop off: Monday-Thursday, 8:00am-4:30pm at the [address] check-in desk      - [employer] [facility] ([address], [place] [address])   Drop off: Monday-Thursday, 8:00am-5:45pm     - [employer] ([address], [place] [address])     Drop off: Monday-Thursday, 3:00pm-5:30pm         Patient Education   Personalized Prevention Plan  You are due for the preventive services outlined below.  Your care team is available to assist you in scheduling these services.  If you have already completed any of these items, please share that information with your care team to update in your medical record.  Health Maintenance Due   Topic Date Due    COVID-19 Vaccine (1) Never done    Zoster (Shingles) Vaccine (1 of 2) Never done    Diptheria Tetanus Pertussis (DTAP/TDAP/TD) Vaccine (2 - Td or Tdap) 08/26/2019    Flu Vaccine (1) Never done    Annual Wellness Visit  09/08/2022    Pneumococcal Vaccine (2 - PCV) 09/08/2022

## 2022-11-10 NOTE — NURSING NOTE
"Chief Complaint   Patient presents with     Physical     Medicare wellness       Initial /77 (BP Location: Right arm, Patient Position: Sitting, Cuff Size: Adult Regular)   Pulse 87   Temp 98.1  F (36.7  C) (Tympanic)   Resp 18   Ht 1.594 m (5' 2.75\")   Wt 70.1 kg (154 lb 9.6 oz)   SpO2 97%   Breastfeeding No   BMI 27.60 kg/m   Estimated body mass index is 27.6 kg/m  as calculated from the following:    Height as of this encounter: 1.594 m (5' 2.75\").    Weight as of this encounter: 70.1 kg (154 lb 9.6 oz).  Medication Reconciliation: complete       FOOD SECURITY SCREENING QUESTIONS:    The next two questions are to help us understand your food security.  If you are feeling you need any assistance in this area, we have resources available to support you today.    Hunger Vital Signs:  Within the past 12 months we worried whether our food would run out before we got money to buy more. Never  Within the past 12 months the food we bought just didn't last and we didn't have money to get more. Never  Raquel Ceja,VF on 11/10/2022 at 2:07 PM      Raquel Ceja    Advance Care Directive reviewed    "

## 2022-11-10 NOTE — PROGRESS NOTES
"SUBJECTIVE:   Ada is a 67 year old who presents for Preventive Visit.      Patient has been advised of split billing requirements and indicates understanding: Yes  Are you in the first 12 months of your Medicare coverage?  No    Healthy Habits:     In general, how would you rate your overall health?  Excellent    Frequency of exercise:  2-3 days/week    Duration of exercise:  30-45 minutes    Do you usually eat at least 4 servings of fruit and vegetables a day, include whole grains    & fiber and avoid regularly eating high fat or \"junk\" foods?  Yes    Taking medications regularly:  Yes    Medication side effects:  Not applicable    Ability to successfully perform activities of daily living:  No assistance needed    Home Safety:  No safety concerns identified    Hearing Impairment:  No hearing concerns    In the past 6 months, have you been bothered by leaking of urine?  No    In general, how would you rate your overall mental or emotional health?  Excellent      PHQ-2 Total Score: 0    Additional concerns today:  No    Has a history of hyperparathyroidism and follows with Dr. Gamboa.  Has an appointment in December for follow up.    Has had a bouts of urinary tract infection in the past.  Usually azo and cranberry juice helps.    Do you feel safe in your environment? Yes    Have you ever done Advance Care Planning? (For example, a Health Directive, POLST, or a discussion with a medical provider or your loved ones about your wishes): Yes, advance care planning is on file.       Fall risk  Fallen 2 or more times in the past year?: No  Any fall with injury in the past year?: No    Cognitive Screening   1) Repeat 3 items (Leader, Season, Table)    2) Clock draw: NORMAL  3) 3 item recall: Recalls 3 objects  Results: 3 items recalled: COGNITIVE IMPAIRMENT LESS LIKELY    Mini-CogTM Copyright ANAIS Botello. Licensed by the author for use in [facility]; reprinted with permission (nicol@.[facility]). All rights " reserved.      Do you have sleep apnea, excessive snoring or daytime drowsiness?: no    Reviewed and updated as needed this visit by clinical staff   Tobacco  Allergies  Meds              Reviewed and updated as needed this visit by Provider                 Social History     Tobacco Use     Smoking status: Never     Smokeless tobacco: Never   Substance Use Topics     Alcohol use: Yes     Comment: occassional         Alcohol Use 11/10/2022   Prescreen: >3 drinks/day or >7 drinks/week? No               Current providers sharing in care for this patient include:   Patient Care Team:  No Ref-Primary, Physician as PCP - Keila Colby NP as Assigned PCP    The following health maintenance items are reviewed in Epic and correct as of today:  Health Maintenance   Topic Date Due     COVID-19 Vaccine (1) Never done     ZOSTER IMMUNIZATION (1 of 2) Never done     DTAP/TDAP/TD IMMUNIZATION (2 - Td or Tdap) 08/26/2019     INFLUENZA VACCINE (1) Never done     MEDICARE ANNUAL WELLNESS VISIT  09/08/2022     Pneumococcal Vaccine: 65+ Years (2 - PCV) 09/08/2022     FALL RISK ASSESSMENT  11/10/2023     COLORECTAL CANCER SCREENING  09/08/2024     MAMMO SCREENING  10/11/2024     LIPID  04/25/2027     ADVANCE CARE PLANNING  05/25/2027     DEXA  11/05/2036     HEPATITIS C SCREENING  Completed     PHQ-2 (once per calendar year)  Completed     IPV IMMUNIZATION  Aged Out     MENINGITIS IMMUNIZATION  Aged Out     BP Readings from Last 3 Encounters:   11/10/22 118/77   06/14/22 122/72   05/16/22 134/68    Wt Readings from Last 3 Encounters:   11/10/22 70.1 kg (154 lb 9.6 oz)   06/14/22 69 kg (152 lb 3.2 oz)   05/16/22 69.4 kg (153 lb)                  Patient Active Problem List   Diagnosis     Routine general medical examination at a health care facility     Migraine headache     Primary hyperparathyroidism (H)     Serum calcium elevated     Past Surgical History:   Procedure Laterality Date     TONSILLECTOMY,  ADENOIDECTOMY, COMBINED             Social History     Tobacco Use     Smoking status: Never     Smokeless tobacco: Never   Substance Use Topics     Alcohol use: Yes     Comment: occassional     Family History   Problem Relation Age of Onset     Heart Disease Mother         Heart Disease     Other - See Comments Mother         Dementia at time of death     Heart Disease Father         Heart Disease, of complications of CAD at age 77, s/p bypass surgery     Diabetes Type 2  Brother      Diabetes Type 2  Brother 72         of hypothermia after he fell in garage     Uterine Cancer Maternal Grandmother 50         of cancer     Heart Disease Maternal Grandfather      Heart Disease Paternal Grandmother 72     Heart Disease Paternal Grandfather 60     Breast Cancer No family hx of         Cancer-breast     Colon Cancer No family hx of      Thyroid Disease No family hx of          Current Outpatient Medications   Medication Sig Dispense Refill     aspirin EC 81 MG EC tablet Take 81 mg by mouth       fish oil-omega-3 fatty acids 1000 MG capsule Take 1 capsule by mouth       methocarbamol (ROBAXIN) 500 MG tablet Take 1 tablet (500 mg) by mouth 3 times daily as needed for muscle spasms 30 tablet 11     vitamin D3 (CHOLECALCIFEROL) 50 mcg (2000 units) tablet Take 1 tablet (50 mcg) by mouth daily       No Known Allergies    Any new diagnosis of family breast, ovarian, or bowel cancer? No    FHS-7:   Breast CA Risk Assessment (FHS-7) 10/7/2021 10/11/2022   Did any of your first-degree relatives have breast or ovarian cancer? No No   Did any of your relatives have bilateral breast cancer? No No   Did any man in your family have breast cancer? No No   Did any woman in your family have breast and ovarian cancer? No No   Did any woman in your family have breast cancer before age 50 y? No No   Do you have 2 or more relatives with breast and/or ovarian cancer? No No   Do you have 2 or more relatives with breast and/or  "bowel cancer? No No       Mammogram Screening: Recommended mammography every 1-2 years with patient discussion and risk factor consideration  Pertinent mammograms are reviewed under the imaging tab.    Review of Systems   Constitutional: Negative for chills and fever.   HENT: Negative for congestion, ear pain, hearing loss and sore throat.    Eyes: Negative for pain and visual disturbance.   Respiratory: Negative for cough and shortness of breath.    Cardiovascular: Negative for chest pain, palpitations and peripheral edema.   Gastrointestinal: Negative for abdominal pain, constipation, diarrhea, heartburn, hematochezia and nausea.   Breasts:  Negative for tenderness, breast mass and discharge.   Genitourinary: Negative for dysuria, frequency, genital sores, hematuria, pelvic pain, urgency, vaginal bleeding and vaginal discharge.   Musculoskeletal: Negative for arthralgias, joint swelling and myalgias.   Skin: Negative for rash.   Neurological: Negative for dizziness, weakness, headaches and paresthesias.   Psychiatric/Behavioral: Negative for mood changes. The patient is not nervous/anxious.          OBJECTIVE:   /77 (BP Location: Right arm, Patient Position: Sitting, Cuff Size: Adult Regular)   Pulse 87   Temp 98.1  F (36.7  C) (Tympanic)   Resp 18   Ht 1.594 m (5' 2.75\")   Wt 70.1 kg (154 lb 9.6 oz)   SpO2 97%   Breastfeeding No   BMI 27.60 kg/m   Estimated body mass index is 27.6 kg/m  as calculated from the following:    Height as of this encounter: 1.594 m (5' 2.75\").    Weight as of this encounter: 70.1 kg (154 lb 9.6 oz).  Physical Exam  Constitutional:       General: She is not in acute distress.     Appearance: She is well-developed.   HENT:      Head: Normocephalic.      Right Ear: Tympanic membrane and external ear normal.      Left Ear: Tympanic membrane and external ear normal.      Nose: Nose normal.      Mouth/Throat:      Pharynx: No oropharyngeal exudate.   Eyes:      General:         " Right eye: No discharge.         Left eye: No discharge.      Conjunctiva/sclera: Conjunctivae normal.      Pupils: Pupils are equal, round, and reactive to light.   Neck:      Thyroid: No thyromegaly.      Trachea: No tracheal deviation.   Cardiovascular:      Rate and Rhythm: Normal rate and regular rhythm.      Pulses: Normal pulses.      Heart sounds: Normal heart sounds, S1 normal and S2 normal. No murmur heard.    No friction rub. No gallop. No S3 or S4 sounds.   Pulmonary:      Effort: Pulmonary effort is normal. No respiratory distress.      Breath sounds: Normal breath sounds. No wheezing or rales.      Comments: Breast exam:  No masses palpable bilaterally.  No skin changes, tethering or axillary lymphadenopathy bilaterally.    Abdominal:      General: Bowel sounds are normal. There is no distension.      Palpations: Abdomen is soft. There is no mass.      Tenderness: There is no abdominal tenderness.   Genitourinary:     Comments: Pelvic/Rectal exams deferred per patient.  Musculoskeletal:         General: Normal range of motion.      Cervical back: Neck supple.   Lymphadenopathy:      Cervical: No cervical adenopathy.   Skin:     General: Skin is warm and dry.      Findings: No rash.   Neurological:      Mental Status: She is alert and oriented to person, place, and time.      Motor: No abnormal muscle tone.      Deep Tendon Reflexes: Reflexes are normal and symmetric.   Psychiatric:         Thought Content: Thought content normal.         Judgment: Judgment normal.           ASSESSMENT / PLAN:       ICD-10-CM    1. Encounter for Medicare annual wellness exam  Z00.00       2. Screen for colon cancer  Z12.11 COLOGUARD([employer])      3. Screening for AAA (aortic abdominal aneurysm)  Z13.6  Aorta Medicare AAA Screening      4. Neck pain  M54.2 methocarbamol (ROBAXIN) 500 MG tablet        1.  Mammogram is up to date, last completed 10/11/22.  DEXA last completed 11/5/21 showed osteopenia.  Consider  "repeating next year.  cologuard ordered.  Pap Smear deferred due to age >65.  Ultrasound to screen for abdominal aortic aneurysm ordered.  She declines Tdap, prevnar, covid, flu and shingrix vaccines.  No other labs desired by patient.  2.  See #1.  3.  See #1.  4.  Methocarbamol refilled, which she has used in the past for relief of chronic neck pain and subsequent headaches.     Patient has been advised of split billing requirements and indicates understanding: Yes      COUNSELING:  Reviewed preventive health counseling, as reflected in patient instructions       Regular exercise       Healthy diet/nutrition       Vision screening       Hearing screening       Osteoporosis prevention/bone health       Colon cancer screening    Estimated body mass index is 27.6 kg/m  as calculated from the following:    Height as of this encounter: 1.594 m (5' 2.75\").    Weight as of this encounter: 70.1 kg (154 lb 9.6 oz).    Weight management plan: Discussed healthy diet and exercise guidelines    She reports that she has never smoked. She has never used smokeless tobacco.      Appropriate preventive services were discussed with this patient, including applicable screening as appropriate for cardiovascular disease, diabetes, osteopenia/osteoporosis, and glaucoma.  As appropriate for age/gender, discussed screening for colorectal cancer, prostate cancer, breast cancer, and cervical cancer. Checklist reviewing preventive services available has been given to the patient.    Reviewed patients plan of care and provided an AVS. The Basic Care Plan (routine screening as documented in Health Maintenance) for Ada meets the Care Plan requirement. This Care Plan has been established and reviewed with the Patient.    Counseling Resources:  ATP IV Guidelines  Pooled Cohorts Equation Calculator  Breast Cancer Risk Calculator  Breast Cancer: Medication to Reduce Risk  FRAX Risk Assessment  ICSI Preventive Guidelines  Dietary Guidelines for " Americans, 2010  USDA's MyPlate  ASA Prophylaxis  Lung CA Screening    Desiree Kang MD  [facility]    Identified Health Risks:

## 2022-11-11 ENCOUNTER — TELEPHONE (OUTPATIENT)
Dept: FAMILY MEDICINE | Facility: OTHER | Age: 67
End: 2022-11-11

## 2022-11-11 DIAGNOSIS — M54.2 NECK PAIN: Primary | ICD-10-CM

## 2022-11-11 NOTE — TELEPHONE ENCOUNTER
Called and verified patient full name and . Notified patient of below.     Carolina Moore LPN on 2022 at 1:21 PM     
Fax received:     Notice of denial of Methocarbonal. Tizanidine is covered.     Carolina Moore LPN on 11/11/2022 at 12:12 PM     
Please notify patient that the methocarbamol was not covered, so I have sent in a prescription for Tizanidine 4 mg three times a day as needed for muscle spasms instead.  Desiree Kang MD   
Private Vehicle

## 2022-11-23 LAB — NONINV COLON CA DNA+OCC BLD SCRN STL QL: NEGATIVE

## 2022-12-01 ENCOUNTER — HOSPITAL ENCOUNTER (OUTPATIENT)
Dept: ULTRASOUND IMAGING | Facility: OTHER | Age: 67
Discharge: HOME | End: 2022-12-01
Attending: FAMILY MEDICINE | Admitting: FAMILY MEDICINE
Payer: MEDICARE

## 2022-12-01 DIAGNOSIS — Z13.6 SCREENING FOR AAA (AORTIC ABDOMINAL ANEURYSM): ICD-10-CM

## 2022-12-01 PROCEDURE — 76706 US ABDL AORTA SCREEN AAA: CPT

## 2022-12-13 ENCOUNTER — OFFICE VISIT (OUTPATIENT)
Dept: FAMILY MEDICINE | Facility: OTHER | Age: 67
End: 2022-12-13
Attending: NURSE PRACTITIONER
Payer: MEDICARE

## 2022-12-13 VITALS
BODY MASS INDEX: 27.27 KG/M2 | HEART RATE: 95 BPM | RESPIRATION RATE: 20 BRPM | TEMPERATURE: 99.7 F | WEIGHT: 152.7 LBS | OXYGEN SATURATION: 94 % | SYSTOLIC BLOOD PRESSURE: 136 MMHG | DIASTOLIC BLOOD PRESSURE: 82 MMHG

## 2022-12-13 DIAGNOSIS — H10.31 ACUTE BACTERIAL CONJUNCTIVITIS OF RIGHT EYE: ICD-10-CM

## 2022-12-13 DIAGNOSIS — J20.8 ACUTE VIRAL BRONCHITIS: Primary | ICD-10-CM

## 2022-12-13 DIAGNOSIS — J02.9 VIRAL PHARYNGITIS: ICD-10-CM

## 2022-12-13 DIAGNOSIS — J02.9 SORE THROAT: ICD-10-CM

## 2022-12-13 LAB — GROUP A STREP BY PCR: NOT DETECTED

## 2022-12-13 PROCEDURE — G0463 HOSPITAL OUTPT CLINIC VISIT: HCPCS

## 2022-12-13 PROCEDURE — 87651 STREP A DNA AMP PROBE: CPT | Mod: ZL | Performed by: NURSE PRACTITIONER

## 2022-12-13 PROCEDURE — 99213 OFFICE O/P EST LOW 20 MIN: CPT | Performed by: NURSE PRACTITIONER

## 2022-12-13 RX ORDER — OFLOXACIN 3 MG/ML
1-2 SOLUTION/ DROPS OPHTHALMIC 4 TIMES DAILY
Qty: 2 ML | Refills: 0 | Status: SHIPPED | OUTPATIENT
Start: 2022-12-13 | End: 2022-12-18

## 2022-12-13 ASSESSMENT — PAIN SCALES - GENERAL: PAINLEVEL: EXTREME PAIN (9)

## 2022-12-13 NOTE — NURSING NOTE
"Chief Complaint   Patient presents with     Throat Pain     Patient is here for a sore throat and cough that started a week ago. Patient had been taking Sudafed with some relief at first and has been taking robitussin with some relief of cough.     Initial /82   Pulse 95   Temp 99.7  F (37.6  C) (Tympanic)   Resp 20   Wt 69.3 kg (152 lb 11.2 oz)   SpO2 94%   BMI 27.27 kg/m   Estimated body mass index is 27.27 kg/m  as calculated from the following:    Height as of 11/10/22: 1.594 m (5' 2.75\").    Weight as of this encounter: 69.3 kg (152 lb 11.2 oz).  Medication Reconciliation: complete    Nneka Steele LPN  "

## 2022-12-13 NOTE — PROGRESS NOTES
ASSESSMENT/PLAN:     I have reviewed the nursing notes.  I have reviewed the findings, diagnosis, plan and need for follow up with the patient.      1. Sore throat    - Group A Streptococcus PCR Throat Swab  Patient requested strep testing today    2. Viral pharyngitis    Negative strep PCR test    Symptomatic treatment -encouraged fluids, salt water gargles, honey, lozenges, tea, soup, smoothies, popsicles, etc    3. Acute viral bronchitis    Discussed with patient that symptoms and exam are consistent with viral illness.    No clinical indications for antibiotic treatment at this time.    Symptomatic treatment - Encouraged fluids, honey, elevation, humidifier, saline nasal spray, sinus rinse/netti pot, lozenges, tea, topical vapor rub, rest, etc     May use over-the-counter Tylenol or ibuprofen PRN  May use over-the-counter cough or cold medicine PRN    Discussed warning signs/symptoms indicative of need to f/u  Follow up if symptoms persist or worsen or concerns    4. Acute bacterial conjunctivitis of right eye    - ofloxacin (OCUFLOX) 0.3 % ophthalmic solution; Place 1-2 drops into the right eye 4 times daily for 5 days  Dispense: 2 mL; Refill: 0    Patient wears contact lenses.  Patient was instructed to avoid use of contact lenses until symptoms have resolved and treatment with eyedrops completed.  Patient was instructed to use a new pair of contact lenses upon resuming use.     Moist compresses PRN  Avoid touching or rubbing eyes  Hand hygiene        I explained my diagnostic considerations and recommendations to the patient, who voiced understanding and agreement with the treatment plan. All questions were answered. We discussed potential side effects of any prescribed or recommended therapies, as well as expectations for response to treatments.    Pat Ludwig NP  [facility]      SUBJECTIVE:   Ada Bal is a 67 year old female who presents to clinic today for the following  health issues:  Sore throat and cough    HPI  Sore throat and cough persisting for the past 7 days without improvement.  Discomfort with swallowing.  Cough varies between congested and dry.  No chest tightness or heaviness.  No shortness of breath.  No fevers, chills or sweats. Intermittent headaches.  Neck tightness.  Clear nasal drainage, congestion and post nasal drainage.  No body aches.  No vomiting.  Appetite fair, still eating but less per meal.  Drinking fluids well, especially tea and water.  Energy decreased. Right eye with mucous and crusting shut starting last night.  Wears contact lenses.    Tried Sudafed and Robitussin DM  Spouse was sick first and illness lasted 2 weeks  Not influenza or covid vaccinated      Past Medical History:   Diagnosis Date     Encounter for full-term uncomplicated delivery     x2     Migraine without status migrainosus, not intractable     No Comments Provided     Premenstrual tension syndrome     anxiety and irritability     Past Surgical History:   Procedure Laterality Date     TONSILLECTOMY, ADENOIDECTOMY, COMBINED      1967     Social History     Tobacco Use     Smoking status: Never     Smokeless tobacco: Never   Substance Use Topics     Alcohol use: Yes     Comment: occassional     Current Outpatient Medications   Medication Sig Dispense Refill     aspirin EC 81 MG EC tablet Take 81 mg by mouth       fish oil-omega-3 fatty acids 1000 MG capsule Take 1 capsule by mouth       tiZANidine (ZANAFLEX) 4 MG tablet Take 1 tablet (4 mg) by mouth 3 times daily as needed for muscle spasms or other (neck pain) 30 tablet 11     vitamin D3 (CHOLECALCIFEROL) 50 mcg (2000 units) tablet Take 1 tablet (50 mcg) by mouth daily       No Known Allergies      Past medical history, past surgical history, current medications and allergies reviewed and accurate to the best of my knowledge.        OBJECTIVE:     /82   Pulse 95   Temp 99.7  F (37.6  C) (Tympanic)   Resp 20   Wt 69.3 kg  (152 lb 11.2 oz)   SpO2 94%   BMI 27.27 kg/m    Body mass index is 27.27 kg/m .     Physical Exam  General Appearance: Well appearing adult female, appropriate appearance for age. No acute distress  Ears: Left TM intact, no erythema, no effusion, no bulging, no purulence.  Right TM intact, no erythema, no effusion, no bulging, no purulence.  Left auditory canal clear without drainage or bleeding.  Right auditory canal clear without drainage or bleeding.  Normal external ears, non tender.  Eyes: Bilateral bulbar and palpebral conjunctivae normal without erythema or irritation, corneas clear.  Left eye without drainage or crusting.  Right eye with mild crusting on eyelashes, no noted active drainage.  No eyelid swelling or erythema. PERRLA.  Orophayrnx: moist mucous membranes, pharynx with minimal erythema, tonsils surgically absent, no oral lesions, no palate petechiae, no post nasal drip seen, no trismus, voice clear.    Nose:  No noted drainage or congestion   Neck: supple without adenopathy  Respiratory: normal chest wall and respirations.  Normal effort.  Clear to auscultation bilaterally, no wheezing, crackles or rhonchi.  No increased work of breathing.  No cough appreciated.  Cardiac: RRR with no murmurs  Musculoskeletal:  Equal movement of bilateral upper extremities.  Equal movement of bilateral lower extremities.  Normal gait.   Psychological: normal affect, alert, oriented, and pleasant.       Labs:  Results for orders placed or performed in visit on 12/13/22   Group A Streptococcus PCR Throat Swab     Status: Normal    Specimen: Throat; Swab   Result Value Ref Range    Group A strep by PCR Not Detected Not Detected    Narrative    The Xpert Xpress Strep A test, performed on the [employer], is a rapid, qualitative in vitro diagnostic test for the detection of Streptococcus pyogenes (Group A ß-hemolytic Streptococcus, Strep A) in throat swab specimens from patients with signs and  symptoms of pharyngitis. The Xpert Xpress Strep A test can be used as an aid in the diagnosis of Group A Streptococcal pharyngitis. The assay is not intended to monitor treatment for Group A Streptococcus infections. The Xpert Xpress Strep A test utilizes an automated real-time polymerase chain reaction (PCR) to detect Streptococcus pyogenes DNA.

## 2022-12-20 ENCOUNTER — OFFICE VISIT (OUTPATIENT)
Dept: FAMILY MEDICINE | Facility: OTHER | Age: 67
End: 2022-12-20
Attending: FAMILY MEDICINE
Payer: COMMERCIAL

## 2022-12-20 VITALS
HEART RATE: 80 BPM | TEMPERATURE: 98.7 F | WEIGHT: 155 LBS | RESPIRATION RATE: 16 BRPM | BODY MASS INDEX: 27.68 KG/M2 | OXYGEN SATURATION: 96 % | DIASTOLIC BLOOD PRESSURE: 72 MMHG | SYSTOLIC BLOOD PRESSURE: 136 MMHG

## 2022-12-20 DIAGNOSIS — J02.9 PHARYNGITIS, UNSPECIFIED ETIOLOGY: Primary | ICD-10-CM

## 2022-12-20 DIAGNOSIS — J04.0 LARYNGITIS: ICD-10-CM

## 2022-12-20 PROCEDURE — 99213 OFFICE O/P EST LOW 20 MIN: CPT | Performed by: FAMILY MEDICINE

## 2022-12-20 PROCEDURE — G0463 HOSPITAL OUTPT CLINIC VISIT: HCPCS

## 2022-12-20 RX ORDER — PREDNISONE 10 MG/1
TABLET ORAL
Qty: 30 TABLET | Refills: 0 | Status: SHIPPED | OUTPATIENT
Start: 2022-12-20 | End: 2023-11-20

## 2022-12-20 ASSESSMENT — PAIN SCALES - GENERAL: PAINLEVEL: MILD PAIN (2)

## 2022-12-20 NOTE — PROGRESS NOTES
Assessment & Plan     1. Pharyngitis, unspecified etiology  2. Laryngitis  Acute, persisting.  Overall symptoms continue to improve and be mild; most bothersome to her is the slight cough and persisting ST.  Rx for prednisone taper sent to pharmacy.  Supportive cares, fluids, rest.  Follow up if fails to continue to improve with time and prednisone.  - predniSONE (DELTASONE) 10 MG tablet; Take 4 tablets by mouth x 3 days; then 3 tabs x 3 days; then 2 tabs x 3 days; then 1 tab x 3 days.  Dispense: 30 tablet; Refill: 0    MDM:  Reviewed RC note from 12/13/2022 and summarized in HPI.  Prescription drug management    No follow-ups on file.    Zhane Galeano, [facility]    [facility]   Ada is a 67 year old, presenting for the following health issues:  Pharyngitis (Sore throat )      History of Present Illness       Reason for visit:  Contined cough & sore throat    She eats 4 or more servings of fruits and vegetables daily.She consumes 1 sweetened beverage(s) daily.She exercises with enough effort to increase her heart rate 9 or less minutes per day.  She exercises with enough effort to increase her heart rate 4 days per week.   She is taking medications regularly.     2 weeks of symptoms.  Thought initially getting a little better; but now persisting for the last week.  Cough worse in the evening.  Has tried sudafed, OTC cough meds, Robitussin DM, Theraflu, AlkaSelzer Cold.  Feels warm and cold but no fever.  One concern for her is that she is leaving for [place] next week.    ST more last week, was tested for strep and negative. Diagnosed at that time with a bronchitis, pharyngitis picture and told to return if not improving.  Was also given ophthalmic gtts for bacterial conjunctivitis of R eye at that time.        Objective    /72 (BP Location: Right arm, Patient Position: Sitting, Cuff Size: Adult Regular)   Pulse 80   Temp 98.7  F (37.1  C) (Tympanic)   Resp 16   Wt 70.3 kg  (155 lb)   SpO2 96%   BMI 27.68 kg/m    Body mass index is 27.68 kg/m .  Physical Exam   GENERAL: healthy, alert and no distress  EYES: Eyes grossly normal to inspection, PERRL and conjunctivae and sclerae normal  HENT: ear canals and TM's normal, nose and mouth without ulcers or lesions  NECK: no adenopathy, no asymmetry, masses, or scars and thyroid normal to palpation  RESP: lungs clear to auscultation - no rales, rhonchi or wheezes  CV: regular rate and rhythm, normal S1 S2, no S3 or S4, no murmur, click or rub, no peripheral edema and peripheral pulses strong  MS: no gross musculoskeletal defects noted, no edema  SKIN: no suspicious lesions or rashes    No results found for any visits on 12/20/22.

## 2022-12-20 NOTE — NURSING NOTE
Chief Complaint   Patient presents with     Pharyngitis     Sore throat        Medication Reconciliation: complete    Bria Marie LPN........................12/20/2022  1:10 PM

## 2022-12-22 ENCOUNTER — LAB (OUTPATIENT)
Dept: LAB | Facility: OTHER | Age: 67
End: 2022-12-22
Attending: INTERNAL MEDICINE
Payer: MEDICARE

## 2022-12-22 ENCOUNTER — TELEPHONE (OUTPATIENT)
Dept: SCHEDULING | Facility: OTHER | Age: 67
End: 2022-12-22

## 2022-12-22 ENCOUNTER — HOSPITAL ENCOUNTER (OUTPATIENT)
Dept: CT IMAGING | Facility: OTHER | Age: 67
Discharge: HOME | End: 2022-12-22
Attending: INTERNAL MEDICINE
Payer: MEDICARE

## 2022-12-22 DIAGNOSIS — E21.3 HYPERPARATHYROIDISM (H): ICD-10-CM

## 2022-12-22 DIAGNOSIS — N20.0 NEPHROLITHIASIS: ICD-10-CM

## 2022-12-22 DIAGNOSIS — E21.0 HYPERPARATHYROIDISM, PRIMARY (H): ICD-10-CM

## 2022-12-22 LAB
ALBUMIN SERPL BCG-MCNC: 4.3 G/DL (ref 3.5–5.2)
ANION GAP SERPL CALCULATED.3IONS-SCNC: 9 MMOL/L (ref 7–15)
BUN SERPL-MCNC: 18.6 MG/DL (ref 8–23)
CALCIUM SERPL-MCNC: 11.4 MG/DL (ref 8.8–10.2)
CHLORIDE SERPL-SCNC: 102 MMOL/L (ref 98–107)
CREAT SERPL-MCNC: 0.78 MG/DL (ref 0.51–0.95)
DEPRECATED HCO3 PLAS-SCNC: 28 MMOL/L (ref 22–29)
GFR SERPL CREATININE-BSD FRML MDRD: 82 ML/MIN/1.73M2
GLUCOSE SERPL-MCNC: 134 MG/DL (ref 70–99)
PHOSPHATE SERPL-MCNC: 2.8 MG/DL (ref 2.5–4.5)
POTASSIUM SERPL-SCNC: 4.4 MMOL/L (ref 3.4–5.3)
PTH-INTACT SERPL-MCNC: 74 PG/ML (ref 15–65)
SODIUM SERPL-SCNC: 139 MMOL/L (ref 136–145)

## 2022-12-22 PROCEDURE — 36415 COLL VENOUS BLD VENIPUNCTURE: CPT | Mod: ZL

## 2022-12-22 PROCEDURE — 82306 VITAMIN D 25 HYDROXY: CPT | Mod: ZL

## 2022-12-22 PROCEDURE — 83970 ASSAY OF PARATHORMONE: CPT | Mod: ZL

## 2022-12-22 PROCEDURE — 80069 RENAL FUNCTION PANEL: CPT | Mod: ZL

## 2022-12-22 PROCEDURE — 74176 CT ABD & PELVIS W/O CONTRAST: CPT

## 2022-12-22 NOTE — TELEPHONE ENCOUNTER
Patient wanted it noted that she was placed on prednisone yesterday and that would influence her gluclose levels on the labs that were drawn.    Noris Reeder on 12/22/2022 at 1:36 PM'

## 2022-12-22 NOTE — TELEPHONE ENCOUNTER
Called and spoke to Patient after verifying last name and date of birth.    Patient wanted Dr Gamboa who ordered her labs to know this information below. He is with [facility]. [facility] and stated she will send him an update. No further action needed.     Shauna Martin RN .............. 12/22/2022  2:01 PM

## 2022-12-23 LAB — DEPRECATED CALCIDIOL+CALCIFEROL SERPL-MC: 32 UG/L (ref 20–75)

## 2023-04-12 ENCOUNTER — TELEPHONE (OUTPATIENT)
Dept: FAMILY MEDICINE | Facility: OTHER | Age: 68
End: 2023-04-12
Payer: COMMERCIAL

## 2023-04-12 NOTE — TELEPHONE ENCOUNTER
Left message to call back. Patient will need to be seen as she does not have PCP here. Her last parathyroid lab was ordered by Dr. Gamboa. I am not sure who or where that provider is from but she may reach out to that provider to see if they will order labs.     Carolina Ragsdale, CMA on 4/12/2023 at 10:46 AM

## 2023-04-12 NOTE — TELEPHONE ENCOUNTER
Would like to know if she could get lab orders for parathyroid and calcium. Please call    Abimael Ward on 4/12/2023 at 10:27 AM

## 2023-04-12 NOTE — TELEPHONE ENCOUNTER
Patient contacted. She states Dr. Gamboa is an Endocrinologist in [place] she sees. She was informed to contact that provider to see if they will order lab work as they follow her for thyroid problems.     Carolina Ragsdale CMA on 4/12/2023 at 11:33 AM

## 2023-04-12 NOTE — TELEPHONE ENCOUNTER
[facility]  Please call back       Toya Cordero on 4/12/2023 at 11:21 AM   Provider Name, If Known (E.G., Dr. WINTERS): Carissa Barrera PA-C Detail Level: Detailed

## 2023-05-01 ENCOUNTER — LAB (OUTPATIENT)
Dept: LAB | Facility: OTHER | Age: 68
End: 2023-05-01
Payer: MEDICARE

## 2023-05-01 DIAGNOSIS — M81.0 OSTEOPOROSIS: ICD-10-CM

## 2023-05-01 LAB
CALCIUM SERPL-MCNC: 10.9 MG/DL (ref 8.8–10.2)
HOLD SPECIMEN: NORMAL
PTH-INTACT SERPL-MCNC: 95 PG/ML (ref 15–65)

## 2023-05-01 PROCEDURE — 83970 ASSAY OF PARATHORMONE: CPT | Mod: ZL

## 2023-05-01 PROCEDURE — 36415 COLL VENOUS BLD VENIPUNCTURE: CPT | Mod: ZL

## 2023-05-01 PROCEDURE — 82310 ASSAY OF CALCIUM: CPT | Mod: ZL

## 2023-11-13 ASSESSMENT — ENCOUNTER SYMPTOMS
ABDOMINAL PAIN: 0
NERVOUS/ANXIOUS: 0
DYSURIA: 0
HEARTBURN: 0
PARESTHESIAS: 0
FREQUENCY: 0
ARTHRALGIAS: 0
SHORTNESS OF BREATH: 0
FEVER: 0
DIARRHEA: 0
MYALGIAS: 0
HEMATURIA: 0
HEADACHES: 0
BREAST MASS: 0
EYE PAIN: 0
NAUSEA: 0
SORE THROAT: 0
DIZZINESS: 0
CHILLS: 0
WEAKNESS: 0
CONSTIPATION: 0
JOINT SWELLING: 0
HEMATOCHEZIA: 0
COUGH: 0
PALPITATIONS: 0

## 2023-11-13 ASSESSMENT — ACTIVITIES OF DAILY LIVING (ADL): CURRENT_FUNCTION: NO ASSISTANCE NEEDED

## 2023-11-16 ENCOUNTER — TELEPHONE (OUTPATIENT)
Dept: FAMILY MEDICINE | Facility: OTHER | Age: 68
End: 2023-11-16
Payer: COMMERCIAL

## 2023-11-16 NOTE — TELEPHONE ENCOUNTER
Would like to know if she needs to have calcium checked before appt and if she needs to fast for it

## 2023-11-20 ENCOUNTER — OFFICE VISIT (OUTPATIENT)
Dept: FAMILY MEDICINE | Facility: OTHER | Age: 68
End: 2023-11-20
Attending: FAMILY MEDICINE
Payer: COMMERCIAL

## 2023-11-20 VITALS
SYSTOLIC BLOOD PRESSURE: 130 MMHG | WEIGHT: 155.6 LBS | DIASTOLIC BLOOD PRESSURE: 80 MMHG | OXYGEN SATURATION: 97 % | HEART RATE: 76 BPM | HEIGHT: 63 IN | TEMPERATURE: 97.3 F | RESPIRATION RATE: 16 BRPM | BODY MASS INDEX: 27.57 KG/M2

## 2023-11-20 DIAGNOSIS — E21.0 PRIMARY HYPERPARATHYROIDISM (H): ICD-10-CM

## 2023-11-20 DIAGNOSIS — M20.5X2 CROSSOVER TOE DEFORMITY OF LEFT FOOT: ICD-10-CM

## 2023-11-20 DIAGNOSIS — Z13.0 SCREENING FOR DEFICIENCY ANEMIA: ICD-10-CM

## 2023-11-20 DIAGNOSIS — Z13.220 SCREENING FOR LIPID DISORDERS: ICD-10-CM

## 2023-11-20 DIAGNOSIS — Z13.29 SCREENING FOR THYROID DISORDER: ICD-10-CM

## 2023-11-20 DIAGNOSIS — Z00.00 ENCOUNTER FOR MEDICARE ANNUAL WELLNESS EXAM: Primary | ICD-10-CM

## 2023-11-20 DIAGNOSIS — Z13.1 SCREENING FOR DIABETES MELLITUS: ICD-10-CM

## 2023-11-20 DIAGNOSIS — Z78.0 POSTMENOPAUSE: ICD-10-CM

## 2023-11-20 DIAGNOSIS — Z12.31 VISIT FOR SCREENING MAMMOGRAM: ICD-10-CM

## 2023-11-20 DIAGNOSIS — J02.9 SORE THROAT: ICD-10-CM

## 2023-11-20 PROBLEM — E21.3 HYPERPARATHYROIDISM (H): Status: ACTIVE | Noted: 2021-09-20

## 2023-11-20 PROCEDURE — G0439 PPPS, SUBSEQ VISIT: HCPCS | Performed by: FAMILY MEDICINE

## 2023-11-20 PROCEDURE — G0463 HOSPITAL OUTPT CLINIC VISIT: HCPCS

## 2023-11-20 RX ORDER — TRIAMCINOLONE ACETONIDE 1 MG/G
CREAM TOPICAL
COMMUNITY
Start: 2023-06-30

## 2023-11-20 RX ORDER — RESPIRATORY SYNCYTIAL VIRUS VACCINE 120MCG/0.5
0.5 KIT INTRAMUSCULAR ONCE
Qty: 1 EACH | Refills: 0 | Status: CANCELLED | OUTPATIENT
Start: 2023-11-20 | End: 2023-11-20

## 2023-11-20 ASSESSMENT — ENCOUNTER SYMPTOMS
SORE THROAT: 0
HEMATOCHEZIA: 0
WEAKNESS: 0
FREQUENCY: 0
SHORTNESS OF BREATH: 0
HEADACHES: 0
CONSTIPATION: 0
BREAST MASS: 0
DYSURIA: 0
ABDOMINAL PAIN: 0
EYE PAIN: 0
PALPITATIONS: 0
COUGH: 0
MYALGIAS: 0
NAUSEA: 0
ARTHRALGIAS: 0
HEARTBURN: 0
JOINT SWELLING: 0
DIZZINESS: 0
CHILLS: 0
FEVER: 0
PARESTHESIAS: 0
HEMATURIA: 0
DIARRHEA: 0
NERVOUS/ANXIOUS: 0

## 2023-11-20 ASSESSMENT — PAIN SCALES - GENERAL: PAINLEVEL: NO PAIN (0)

## 2023-11-20 ASSESSMENT — ACTIVITIES OF DAILY LIVING (ADL): CURRENT_FUNCTION: NO ASSISTANCE NEEDED

## 2023-11-20 NOTE — PROGRESS NOTES
"SUBJECTIVE:   Ada is a 68 year old, presenting for the following:  Wellness Visit        11/20/2023     8:15 AM   Additional Questions   Roomed by Carolina Moore       Are you in the first 12 months of your Medicare coverage?  No    Has had some issues with sore throat at times. Happens a couple of times per year.  She gets hoarse and has difficulty swallowing.  Once when it happened, she had tested positive for covid.  Lasts up to 3 weeks when she gets it.  She has had to take steroids at times for this.  Wonders if there is anything she could do to prevent it.    Had parathyroidectomy on 5/15/23 at [place].  Needs calcium level checked as well as PTH again and faxed to surgeon Boubacar Sargent MD at [place].    Left foot developed a 2nd toe that crosses over her 1st.  Has needed to wear shoes that are extra wide.  No pain.  Walking ok.  As long as she wears shoes that accommodate, she does ok.    Healthy Habits:     In general, how would you rate your overall health?  Excellent    Frequency of exercise:  2-3 days/week    Duration of exercise:  15-30 minutes    Do you usually eat at least 4 servings of fruit and vegetables a day, include whole grains    & fiber and avoid regularly eating high fat or \"junk\" foods?  Yes    Taking medications regularly:  Yes    Medication side effects:  None    Ability to successfully perform activities of daily living:  No assistance needed    Home Safety:  No safety concerns identified    Hearing Impairment:  No hearing concerns    In the past 6 months, have you been bothered by leaking of urine?  No    In general, how would you rate your overall mental or emotional health?  Excellent    Additional concerns today:  Yes      Today's PHQ-2 Score:       11/20/2023     8:11 AM   PHQ-2 ( 1999 [employer])   Q1: Little interest or pleasure in doing things 0   Q2: Feeling down, depressed or hopeless 0   PHQ-2 Score 0   Q1: Little interest or pleasure in doing things Not at all   Q2: Feeling down, " depressed or hopeless Not at all   PHQ-2 Score 0       Have you ever done Advance Care Planning? (For example, a Health Directive, POLST, or a discussion with a medical provider or your loved ones about your wishes): Yes, advance care planning is on file.       Fall risk  Fallen 2 or more times in the past year?: No  Any fall with injury in the past year?: No    Cognitive Screening   1) Repeat 3 items (Leader, Season, Table)    2) Clock draw: NORMAL  3) 3 item recall: Recalls 3 objects  Results: 3 items recalled: COGNITIVE IMPAIRMENT LESS LIKELY    Mini-CogTM Copyright S Rosmer[employer]. Licensed by the author for use in [facility]; reprinted with permission (nicol@[facility]). All rights reserved.      Do you have sleep apnea, excessive snoring or daytime drowsiness? : no    Reviewed and updated as needed this visit by clinical staff   Tobacco  Allergies  Meds  Problems             Reviewed and updated as needed this visit by Provider    Allergies  Meds  Problems            Social History     Tobacco Use    Smoking status: Never    Smokeless tobacco: Never   Substance Use Topics    Alcohol use: Yes     Comment: occassional             11/13/2023     8:43 PM   Alcohol Use   Prescreen: >3 drinks/day or >7 drinks/week? No     Do you have a current opioid prescription? No  Do you use any other controlled substances or medications that are not prescribed by a provider? None      Current providers sharing in care for this patient include:   Patient Care Team:  Desiree Kang MD as PCP - General (Family Medicine)  Desiree Kang MD as Assigned PCP    The following health maintenance items are reviewed in Epic and correct as of today:  Health Maintenance   Topic Date Due    COVID-19 Vaccine (1) Never done    ZOSTER IMMUNIZATION (1 of 2) Never done    RSV VACCINE (Pregnancy & 60+) (1 - 1-dose 60+ series) Never done    DTAP/TDAP/TD IMMUNIZATION (2 - Td or Tdap) 08/26/2019    Pneumococcal Vaccine:  65+ Years (2 - PCV) 2022    INFLUENZA VACCINE (1) Never done    MAMMO SCREENING  10/11/2024    MEDICARE ANNUAL WELLNESS VISIT  2024    FALL RISK ASSESSMENT  2024    COLORECTAL CANCER SCREENING  2025    LIPID  2027    ADVANCE CARE PLANNING  2028    DEXA  2036    HEPATITIS C SCREENING  Completed    PHQ-2 (once per calendar year)  Completed    IPV IMMUNIZATION  Aged Out    HPV IMMUNIZATION  Aged Out    MENINGITIS IMMUNIZATION  Aged Out    RSV MONOCLONAL ANTIBODY  Aged Out     Patient Active Problem List   Diagnosis    Routine general medical examination at a health care facility    Migraine headache    Hyperparathyroidism (H24)    Serum calcium elevated     Past Surgical History:   Procedure Laterality Date    PARATHYROIDECTOMY Left 05/15/2023    [facility] left side parathyroid adenoma.    TONSILLECTOMY, ADENOIDECTOMY, COMBINED             Social History     Tobacco Use    Smoking status: Never    Smokeless tobacco: Never   Substance Use Topics    Alcohol use: Yes     Comment: occassional     Family History   Problem Relation Age of Onset    Heart Disease Mother         Heart Disease    Other - See Comments Mother         Dementia at time of death    Heart Disease Father         Heart Disease, of complications of CAD at age 77, s/p bypass surgery    Diabetes Type 2  Brother     Diabetes Type 2  Brother 72         of hypothermia after he fell in garage    Uterine Cancer Maternal Grandmother 50         of cancer    Heart Disease Maternal Grandfather     Heart Disease Paternal Grandmother 72    Heart Disease Paternal Grandfather 60    Breast Cancer No family hx of         Cancer-breast    Colon Cancer No family hx of     Thyroid Disease No family hx of          Current Outpatient Medications   Medication Sig Dispense Refill    aspirin EC 81 MG EC tablet Take 81 mg by mouth      CALCIUM PO       fish oil-omega-3 fatty acids 1000 MG capsule Take 1 capsule by mouth       "tiZANidine (ZANAFLEX) 4 MG tablet Take 1 tablet (4 mg) by mouth 3 times daily as needed for muscle spasms or other (neck pain) 30 tablet 11    triamcinolone (KENALOG) 0.1 % external cream APPLY TWICE DAILY TO RASH ON LEG TWICE DAILY AS NEEDED. STOP WHEN CLEAR. RESUME AS NEEDED FOR FLARES. DO NOT USE ON FACE, ARMPITS, OR GROI      vitamin D3 (CHOLECALCIFEROL) 50 mcg (2000 units) tablet Take 1 tablet (50 mcg) by mouth daily       No Known Allergies          11/13/2023     8:44 PM   Breast CA Risk Assessment (FHS-7)   Do you have a family history of breast, colon, or ovarian cancer? No / Unknown         Mammogram Screening: Recommended mammography every 1-2 years with patient discussion and risk factor consideration  Pertinent mammograms are reviewed under the imaging tab.    Review of Systems   Constitutional:  Negative for chills and fever.   HENT:  Negative for congestion, ear pain, hearing loss and sore throat.    Eyes:  Negative for pain and visual disturbance.   Respiratory:  Negative for cough and shortness of breath.    Cardiovascular:  Negative for chest pain, palpitations and peripheral edema.   Gastrointestinal:  Negative for abdominal pain, constipation, diarrhea, heartburn, hematochezia and nausea.   Breasts:  Negative for breast mass.   Genitourinary:  Negative for dysuria, frequency, genital sores, hematuria, pelvic pain, urgency and vaginal discharge.   Musculoskeletal:  Negative for arthralgias, joint swelling and myalgias.   Skin:  Negative for rash.   Neurological:  Negative for dizziness, weakness, headaches and paresthesias.   Psychiatric/Behavioral:  Negative for mood changes. The patient is not nervous/anxious.          OBJECTIVE:   /80   Pulse 76   Temp 97.3  F (36.3  C) (Tympanic)   Resp 16   Ht 1.6 m (5' 3\")   Wt 70.6 kg (155 lb 9.6 oz)   SpO2 97%   Breastfeeding No   BMI 27.56 kg/m   Estimated body mass index is 27.56 kg/m  as calculated from the following:    Height as of this " "encounter: 1.6 m (5' 3\").    Weight as of this encounter: 70.6 kg (155 lb 9.6 oz).  Physical Exam  Constitutional:       General: She is not in acute distress.     Appearance: She is well-developed.   HENT:      Head: Normocephalic.      Right Ear: Tympanic membrane and external ear normal.      Left Ear: Tympanic membrane and external ear normal.      Nose: Nose normal.      Mouth/Throat:      Mouth: Mucous membranes are moist.      Pharynx: Oropharynx is clear. No oropharyngeal exudate or posterior oropharyngeal erythema.   Eyes:      General:         Right eye: No discharge.         Left eye: No discharge.      Conjunctiva/sclera: Conjunctivae normal.      Pupils: Pupils are equal, round, and reactive to light.   Neck:      Thyroid: No thyromegaly.      Trachea: No tracheal deviation.   Cardiovascular:      Rate and Rhythm: Normal rate and regular rhythm.      Pulses: Normal pulses.      Heart sounds: Normal heart sounds, S1 normal and S2 normal. No murmur heard.     No friction rub. No gallop. No S3 or S4 sounds.   Pulmonary:      Effort: Pulmonary effort is normal. No respiratory distress.      Breath sounds: Normal breath sounds. No wheezing or rales.      Comments: Breast exam:  No masses palpable bilaterally.  No skin changes, tethering or axillary lymphadenopathy bilaterally.    Abdominal:      General: Bowel sounds are normal. There is no distension.      Palpations: Abdomen is soft. There is no mass.      Tenderness: There is no abdominal tenderness.   Genitourinary:     Comments: Pelvic/Rectal exams deferred per patient.  Musculoskeletal:         General: Normal range of motion.      Cervical back: Neck supple.      Comments: Left foot: Second toe crosses over the first toe.   Lymphadenopathy:      Cervical: No cervical adenopathy.   Skin:     General: Skin is warm and dry.      Findings: No rash.   Neurological:      Mental Status: She is alert and oriented to person, place, and time.      Motor: No " abnormal muscle tone.      Deep Tendon Reflexes: Reflexes are normal and symmetric.   Psychiatric:         Mood and Affect: Mood normal.         Thought Content: Thought content normal.         Judgment: Judgment normal.           Diagnostic Test Results:  Labs reviewed in Epic    ASSESSMENT / PLAN:       ICD-10-CM    1. Encounter for Medicare annual wellness exam  Z00.00       2. Visit for screening mammogram  Z12.31 MA Screening Bilateral w/ Carlos      3. Postmenopause  Z78.0 DX Hip/Pelvis/Spine      4. Screening for lipid disorders  Z13.220 Lipid Profile      5. Screening for diabetes mellitus  Z13.1 Comprehensive metabolic panel      6. Screening for thyroid disorder  Z13.29 TSH with free T4 reflex      7. Screening for deficiency anemia  Z13.0 CBC with Platelets & Differential      8. Primary hyperparathyroidism (H24)  E21.0       9. 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 Sore throat  J02.9         1.  Mammogram ordered.  DEXA scan ordered.  Cologuard is up-to-date, last completed 11/17/2022 and was normal.  Pap deferred due to age greater than 65.  AAA screening was completed in 2022 and was normal.  She declines Tdap, Prevnar, COVID, flu, Shingrix and RSV vaccines.  2.  See #1.  3.  See #1.  4.  She is going to return to clinic another day to have labs including a fasting lipid profile.  5.  Comprehensive metabolic profile will be completed with labs.  6.  TSH will be completed with labs.  7.  CBC will be completed with labs.  8.  She has a written order from her surgeon to have a calcium level as well as a parathyroid hormone level.  She will bring the paper order with her when she comes in for her fasting lab appointment in the near future.  9.  This does not cause her any pain or difficulty in walking right now.  Discussed that if she wishes to fix this or if she is developing pain, discomfort, etc. in the future she may consider referral to podiatry.  10.  Discussed that these sore throats  that she has had a couple times a year are most likely due to viral illness.  Discussed vaccination to try to prevent illness, she is not interested in any vaccines as noted in #1.  Discussed that allergies often can also cause sore throat and she could consider taking Zyrtec or Claritin, etc. during allergic times of the year.  Otherwise, discussed healthy eating, exercise, getting enough sleep, etc. to keep well to try to prevent illness.    Patient has been advised of split billing requirements and indicates understanding: Yes      COUNSELING:  Reviewed preventive health counseling, as reflected in patient instructions       Regular exercise       Healthy diet/nutrition       Vision screening       Hearing screening       Dental care       Bladder control       Fall risk prevention       Osteoporosis prevention/bone health       Colon cancer screening        She reports that she has never smoked. She has never used smokeless tobacco.      Appropriate preventive services were discussed with this patient, including applicable screening as appropriate for fall prevention, nutrition, physical activity, Tobacco-use cessation, weight loss and cognition.  Checklist reviewing preventive services available has been given to the patient.    Reviewed patients plan of care and provided an AVS. The Basic Care Plan (routine screening as documented in Health Maintenance) for Ada meets the Care Plan requirement. This Care Plan has been established and reviewed with the Patient.        Desiree Kang MD  [facility]    Identified Health Risks:  I have reviewed Opioid Use Disorder and Substance Use Disorder risk factors and made any needed referrals.

## 2023-11-20 NOTE — NURSING NOTE
Chief Complaint   Patient presents with    Wellness Visit         Medication Reconciliation: complete    Carolina Moore, LPN

## 2023-11-20 NOTE — PATIENT INSTRUCTIONS
Patient Education   Personalized Prevention Plan  You are due for the preventive services outlined below.  Your care team is available to assist you in scheduling these services.  If you have already completed any of these items, please share that information with your care team to update in your medical record.  Health Maintenance Due   Topic Date Due     COVID-19 Vaccine (1) Never done     Zoster (Shingles) Vaccine (1 of 2) Never done     RSV VACCINE (Pregnancy & 60+) (1 - 1-dose 60+ series) Never done     Diptheria Tetanus Pertussis (DTAP/TDAP/TD) Vaccine (2 - Td or Tdap) 08/26/2019     Pneumococcal Vaccine (2 - PCV) 09/08/2022     Flu Vaccine (1) Never done     Annual Wellness Visit  11/10/2023

## 2023-11-24 ENCOUNTER — LAB (OUTPATIENT)
Dept: LAB | Facility: OTHER | Age: 68
End: 2023-11-24
Attending: FAMILY MEDICINE
Payer: MEDICARE

## 2023-11-24 DIAGNOSIS — Z13.0 SCREENING FOR DEFICIENCY ANEMIA: ICD-10-CM

## 2023-11-24 DIAGNOSIS — Z13.220 SCREENING FOR LIPID DISORDERS: ICD-10-CM

## 2023-11-24 DIAGNOSIS — Z13.29 SCREENING FOR THYROID DISORDER: ICD-10-CM

## 2023-11-24 DIAGNOSIS — Z13.1 SCREENING FOR DIABETES MELLITUS: ICD-10-CM

## 2023-11-24 LAB
ALBUMIN SERPL BCG-MCNC: 4.4 G/DL (ref 3.5–5.2)
ALP SERPL-CCNC: 99 U/L (ref 40–150)
ALT SERPL W P-5'-P-CCNC: 19 U/L (ref 0–50)
ANION GAP SERPL CALCULATED.3IONS-SCNC: 8 MMOL/L (ref 7–15)
AST SERPL W P-5'-P-CCNC: 18 U/L (ref 0–45)
BASOPHILS # BLD AUTO: 0.1 10E3/UL (ref 0–0.2)
BASOPHILS NFR BLD AUTO: 1 %
BILIRUB SERPL-MCNC: 0.5 MG/DL
BUN SERPL-MCNC: 21 MG/DL (ref 8–23)
CALCIUM SERPL-MCNC: 9.6 MG/DL (ref 8.8–10.2)
CHLORIDE SERPL-SCNC: 101 MMOL/L (ref 98–107)
CHOLEST SERPL-MCNC: 175 MG/DL
CREAT SERPL-MCNC: 0.91 MG/DL (ref 0.51–0.95)
DEPRECATED HCO3 PLAS-SCNC: 29 MMOL/L (ref 22–29)
EGFRCR SERPLBLD CKD-EPI 2021: 68 ML/MIN/1.73M2
EOSINOPHIL # BLD AUTO: 0.1 10E3/UL (ref 0–0.7)
EOSINOPHIL NFR BLD AUTO: 3 %
ERYTHROCYTE [DISTWIDTH] IN BLOOD BY AUTOMATED COUNT: 12.4 % (ref 10–15)
GLUCOSE SERPL-MCNC: 90 MG/DL (ref 70–99)
HCT VFR BLD AUTO: 43 % (ref 35–47)
HDLC SERPL-MCNC: 48 MG/DL
HGB BLD-MCNC: 14.5 G/DL (ref 11.7–15.7)
IMM GRANULOCYTES # BLD: 0 10E3/UL
IMM GRANULOCYTES NFR BLD: 0 %
LDLC SERPL CALC-MCNC: 111 MG/DL
LYMPHOCYTES # BLD AUTO: 1.4 10E3/UL (ref 0.8–5.3)
LYMPHOCYTES NFR BLD AUTO: 33 %
MCH RBC QN AUTO: 31.9 PG (ref 26.5–33)
MCHC RBC AUTO-ENTMCNC: 33.7 G/DL (ref 31.5–36.5)
MCV RBC AUTO: 95 FL (ref 78–100)
MONOCYTES # BLD AUTO: 0.5 10E3/UL (ref 0–1.3)
MONOCYTES NFR BLD AUTO: 13 %
NEUTROPHILS # BLD AUTO: 2.1 10E3/UL (ref 1.6–8.3)
NEUTROPHILS NFR BLD AUTO: 50 %
NONHDLC SERPL-MCNC: 127 MG/DL
NRBC # BLD AUTO: 0 10E3/UL
NRBC BLD AUTO-RTO: 0 /100
PLATELET # BLD AUTO: 197 10E3/UL (ref 150–450)
POTASSIUM SERPL-SCNC: 4.2 MMOL/L (ref 3.4–5.3)
PROT SERPL-MCNC: 7.2 G/DL (ref 6.4–8.3)
RBC # BLD AUTO: 4.54 10E6/UL (ref 3.8–5.2)
SODIUM SERPL-SCNC: 138 MMOL/L (ref 135–145)
TRIGL SERPL-MCNC: 78 MG/DL
TSH SERPL DL<=0.005 MIU/L-ACNC: 2.49 UIU/ML (ref 0.3–4.2)
WBC # BLD AUTO: 4.2 10E3/UL (ref 4–11)

## 2023-11-24 PROCEDURE — 82435 ASSAY OF BLOOD CHLORIDE: CPT | Mod: ZL

## 2023-11-24 PROCEDURE — 80061 LIPID PANEL: CPT | Mod: ZL

## 2023-11-24 PROCEDURE — 84443 ASSAY THYROID STIM HORMONE: CPT | Mod: ZL

## 2023-11-24 PROCEDURE — 85025 COMPLETE CBC W/AUTO DIFF WBC: CPT | Mod: ZL

## 2023-11-24 PROCEDURE — 36415 COLL VENOUS BLD VENIPUNCTURE: CPT | Mod: ZL

## 2023-12-18 NOTE — TELEPHONE ENCOUNTER
I have sent patient messages through her [employer] account when she has had labs, etc.  On the recent bone density scan report, I also had nursing staff try placing a call to her to discuss the results.  I can place an order to have calcium level rechecked however I would still recommend that she proceed with seeing the endocrinologist.  I will place a lab only order for calcium level to be rechecked.  I will let her know the results when it returns however please make sure she is monitoring her [employer] account so that she gets test results.  She will need to make a lab only appointment.   Wheelchair/Stroller

## 2024-02-13 ENCOUNTER — HOSPITAL ENCOUNTER (OUTPATIENT)
Dept: BONE DENSITY | Facility: OTHER | Age: 69
Discharge: HOME | End: 2024-02-13
Attending: FAMILY MEDICINE
Payer: MEDICARE

## 2024-02-13 ENCOUNTER — HOSPITAL ENCOUNTER (OUTPATIENT)
Dept: MAMMOGRAPHY | Facility: OTHER | Age: 69
Discharge: HOME | End: 2024-02-13
Attending: FAMILY MEDICINE
Payer: MEDICARE

## 2024-02-13 DIAGNOSIS — Z12.31 VISIT FOR SCREENING MAMMOGRAM: ICD-10-CM

## 2024-02-13 DIAGNOSIS — Z78.0 POSTMENOPAUSE: ICD-10-CM

## 2024-02-13 PROCEDURE — 77080 DXA BONE DENSITY AXIAL: CPT

## 2024-02-13 PROCEDURE — 77063 BREAST TOMOSYNTHESIS BI: CPT

## 2024-08-05 ENCOUNTER — OFFICE VISIT (OUTPATIENT)
Dept: OBGYN | Facility: OTHER | Age: 69
End: 2024-08-05
Attending: NURSE PRACTITIONER
Payer: COMMERCIAL

## 2024-08-05 VITALS
WEIGHT: 156 LBS | BODY MASS INDEX: 27.63 KG/M2 | SYSTOLIC BLOOD PRESSURE: 138 MMHG | DIASTOLIC BLOOD PRESSURE: 72 MMHG | HEART RATE: 69 BPM

## 2024-08-05 DIAGNOSIS — N81.4 UTERINE PROLAPSE: ICD-10-CM

## 2024-08-05 DIAGNOSIS — N89.8 ITCHING IN THE VAGINAL AREA: Primary | ICD-10-CM

## 2024-08-05 DIAGNOSIS — Z46.89 ENCOUNTER FOR FITTING AND ADJUSTMENT OF PESSARY: ICD-10-CM

## 2024-08-05 LAB
ALBUMIN UR-MCNC: NEGATIVE MG/DL
APPEARANCE UR: CLEAR
BACTERIAL VAGINOSIS VAG-IMP: NEGATIVE
BILIRUB UR QL STRIP: NEGATIVE
CANDIDA DNA VAG QL NAA+PROBE: NOT DETECTED
CANDIDA GLABRATA / CANDIDA KRUSEI DNA: NOT DETECTED
COLOR UR AUTO: ABNORMAL
GLUCOSE UR STRIP-MCNC: NEGATIVE MG/DL
HGB UR QL STRIP: NEGATIVE
KETONES UR STRIP-MCNC: NEGATIVE MG/DL
LEUKOCYTE ESTERASE UR QL STRIP: ABNORMAL
MUCOUS THREADS #/AREA URNS LPF: PRESENT /LPF
NITRATE UR QL: NEGATIVE
PH UR STRIP: 6 [PH] (ref 5–9)
RBC URINE: 1 /HPF
SP GR UR STRIP: 1.01 (ref 1–1.03)
T VAGINALIS DNA VAG QL NAA+PROBE: NOT DETECTED
UROBILINOGEN UR STRIP-MCNC: NORMAL MG/DL
WBC URINE: <1 /HPF

## 2024-08-05 PROCEDURE — 81001 URINALYSIS AUTO W/SCOPE: CPT | Mod: ZL | Performed by: NURSE PRACTITIONER

## 2024-08-05 PROCEDURE — 0352U MULTIPLEX VAGINAL PANEL BY PCR: CPT | Mod: ZL | Performed by: NURSE PRACTITIONER

## 2024-08-05 PROCEDURE — 57160 INSERT PESSARY/OTHER DEVICE: CPT | Performed by: NURSE PRACTITIONER

## 2024-08-05 PROCEDURE — 51798 US URINE CAPACITY MEASURE: CPT | Performed by: NURSE PRACTITIONER

## 2024-08-05 PROCEDURE — G0463 HOSPITAL OUTPT CLINIC VISIT: HCPCS

## 2024-08-05 PROCEDURE — 99214 OFFICE O/P EST MOD 30 MIN: CPT | Mod: 25 | Performed by: NURSE PRACTITIONER

## 2024-08-05 RX ORDER — CLOBETASOL PROPIONATE 0.5 MG/G
OINTMENT TOPICAL
Qty: 42 G | Refills: 1 | Status: SHIPPED | OUTPATIENT
Start: 2024-08-05 | End: 2024-09-02

## 2024-08-05 ASSESSMENT — PAIN SCALES - GENERAL: PAINLEVEL: SEVERE PAIN (6)

## 2024-08-05 NOTE — NURSING NOTE
"Chief Complaint   Patient presents with    Uterine Prolapse     Patient is uterine prolapse. Patient did have vaginal deliveries. Patient does feel like she empties bladder.   Initial /72   Pulse 69   Wt 70.8 kg (156 lb)   BMI 27.63 kg/m   Estimated body mass index is 27.63 kg/m  as calculated from the following:    Height as of 11/20/23: 1.6 m (5' 3\").    Weight as of this encounter: 70.8 kg (156 lb).  Medication Reconciliation: complete        Post void residual 2 ml   Brittany Quinn LPN    "

## 2024-08-05 NOTE — PROGRESS NOTES
Consult    Chief Complaint: Evaluation of vaginal prolapse    HPI:    Ada Bal is a 69 year old , here for evaluation of vaginal prolapse.    Patient states that a week ago she had external vaginal itching.  She will intermittently have vaginal itching and occasionally has sores and open areas.  She states she has had flares of vaginal itching and sores on intermittently for years.  Itching is starting to subside.  She is used topical hydrocortisone for the past couple of days.  Friday, 4 days ago she felt a protrusion out of her vaginal area.  This caused discomfort over the weekend with sitting and standing.  The pain has made it difficult to sit, stand and walk.  She is having intermittent shocks of pain throughout her pelvic area.    She denies any dysuria, urinary frequency or urgency.  She feels like she is completely emptying her bladder.    Patient went through menopause around the age of 49.  She has had 2 vaginal births, her largest baby being 9 pound 7 ounces.  She has not been on hormonal replacement therapy in the past.    Patient is still sexually active.  She denies any pain with intercourse but does report vaginal dryness and discomfort at times.    She denies any issues with constipation.  Denies any issues with urinary leakage with urgency or with stress.    No LMP recorded. Patient is postmenopausal.      OBHx  OB History    Para Term  AB Living   2 2 2 0 0 2   SAB IAB Ectopic Multiple Live Births   0 0 0 0 0       ROS: 10-Point ROS negative except as noted in HPI    Physical Exam  /72   Pulse 69   Wt 70.8 kg (156 lb)   BMI 27.63 kg/m    Gen: Well-appearing, well developed  Resp: nonlabored, normal effort  GI: soft, nontender, no flank pain  MS: moving without difficulty  Psych: appropriate mood and affect  Pelvic:  External female genitalia has bilateral labia scarring/fusion of labia majora.  Shallow laceration over left labia majora.  No drainage.   Normal hair distribution. Vagina is without lesions.  Minimal white vaginal discharge. Cervix normal, no lesions, no cervical motion tenderness. Uterus is small, mobile, non-tender. No adnexal tenderness or masses. Urethral mobility present.  Uterine prolapse past introitus by 1 cm, grade 3 anterior prolapse.    PVR: Residual urine by ultrasound was 2 ml.        LABS: The last test results for Ms. Ada Bal were reviewed.   Results for orders placed or performed in visit on 08/05/24 (from the past 24 hour(s))   UA with Microscopic reflex to Culture    Specimen: Urine, Midstream   Result Value Ref Range    Color Urine Light Yellow Colorless, Straw, Light Yellow, Yellow    Appearance Urine Clear Clear    Glucose Urine Negative Negative mg/dL    Bilirubin Urine Negative Negative    Ketones Urine Negative Negative mg/dL    Specific Gravity Urine 1.014 1.000 - 1.030    Blood Urine Negative Negative    pH Urine 6.0 5.0 - 9.0    Protein Albumin Urine Negative Negative mg/dL    Urobilinogen Urine Normal Normal, 2.0 mg/dL    Nitrite Urine Negative Negative    Leukocyte Esterase Urine Small (A) Negative    Mucus Urine Present (A) None Seen /LPF    RBC Urine 1 <=2 /HPF    WBC Urine <1 <=5 /HPF    Narrative    Urine Culture not indicated   Multiplex Vaginal Panel by PCR    Specimen: Vagina; Swab   Result Value Ref Range    Bacterial Vaginosis Organism DNA Negative Negative    Candida Group DNA Not Detected Not Detected    Candida glabrata / Candy krusei DNA Not Detected Not Detected    Trichomonas vaginalis DNA Not Detected Not Detected    Narrative    The Xpert  Xpress MVP test, performed on the [employer]  Instrument Systems, is an automated, qualitative in vitro diagnostic test for the detection of DNA targets from anaerobic bacteria associated with bacterial vaginosis, Candida species associated with vulvovaginal candidiasis, and Trichomonas vaginalis. The assay uses clinician-collected and self-collected vaginal  swabs from patients who are symptomatic for vaginitis/ vaginosis. The Xpert  Xpress MVP test utilizes real-time polymerase chain reaction (PCR) for the amplification of specific DNA targets and utilizes fluorogenic target-specific hybridization probes to detect and differentiate DNA. It is intended to aid in the diagnosis of vaginal infections in women with a clinical presentation consistent with bacterial vaginosis, vulvovaginal candidiasis, or trichomoniasis.   The assay targets three anaerobic microorgansims that are associated with bacterial vaginosis (BV). Other organisms that are not detected by the Xpert  Xpress MVP test have also been reported to be associated with BV. The BV organism and Candida species targets of the Xpert  Xpress MVP test can be commensal in women; positive results must be considered in conjunction with other clinical and patient information to determine the disease status.       ASSESSMENT/PLAN  Ada Bal is a 69 year old  female here for evaluation of uterine prolapse.    1. Itching in the vaginal area  - Multiplex Vaginal Panel by PCR  - clobetasol (TEMOVATE) 0.05 % external ointment; Apply 1 g topically 2 times daily for 14 days, THEN 1 g at bedtime for 14 days.  Dispense: 42 g; Refill: 1    Patient has had flares of intermittent external vaginal itching and external vaginal sores.  Patient does have shallow laceration/excoriation over her left external labia.  Review clinical findings of suspected lichen sclerosis with patient and chronic nature of this condition.  I recommend trial of topical clobetasol ointment twice daily for 14 days then once daily at bedtime for treatment of suspected lichen sclerosis.  She will continue clobetasol to see if this improves vaginal itching and healing of labial excoriation with close follow-up in 1 month, or sooner if needed.  Education is provided via AVS on lichen sclerosus    2. Uterine prolapse  - UA with Microscopic reflex to  Culture  - MEASURE POST-VOID RESIDUAL URINE/BLADDER CAPACITY, US NON-IMAGING    Patient does have grade 3 anterior uterine prolapse.  She is not retaining urine and able to empty bladder completely.  She is having increased discomfort and pain with sitting, standing and changing position.  Patient is still sexually active with . Discussed with patient that she does have grade 3 anterior uterine prolapse.  We review interventions for treatment including conservative treatment with pessary care versus surgical intervention.  She elects to proceed with pessary care.  Patient is sized and fitted for pessary today in clinic.    #4 Ring pessary with support is inserted today    Patient tolerated this well.  She denies any discomfort.  Discussed expectations for maintaining pessary.  Review how to remove this and clean this at home and how to remove prior to intimacy.  Reviewed patient that she may call or return at any time for further education and help with removal of pessary if this is something she desires to do at home.  Otherwise patient knows she may return every 3 months for pessary care.  I will see patient back for follow-up in 1 month to ensure she is tolerating this well.    3. Encounter for fitting and adjustment of pessary  #4 Ring pessary with support is inserted today.  Patient will contact us with any concerns otherwise I will see patient for follow-up in 1 month.    Jodie Barrett NP  [facility]    OB/GYN  8/5/2024 2:53 PM

## 2024-08-15 ENCOUNTER — OFFICE VISIT (OUTPATIENT)
Dept: OBGYN | Facility: OTHER | Age: 69
End: 2024-08-15
Attending: NURSE PRACTITIONER
Payer: COMMERCIAL

## 2024-08-15 VITALS
RESPIRATION RATE: 16 BRPM | BODY MASS INDEX: 27.51 KG/M2 | DIASTOLIC BLOOD PRESSURE: 74 MMHG | HEART RATE: 64 BPM | WEIGHT: 155.3 LBS | SYSTOLIC BLOOD PRESSURE: 130 MMHG

## 2024-08-15 DIAGNOSIS — N89.8 VAGINAL DISCHARGE: Primary | ICD-10-CM

## 2024-08-15 DIAGNOSIS — N30.01 ACUTE CYSTITIS WITH HEMATURIA: ICD-10-CM

## 2024-08-15 DIAGNOSIS — R10.2 SUPRAPUBIC PRESSURE: ICD-10-CM

## 2024-08-15 LAB
ALBUMIN UR-MCNC: NEGATIVE MG/DL
APPEARANCE UR: CLEAR
BACTERIA #/AREA URNS HPF: ABNORMAL /HPF
BACTERIAL VAGINOSIS VAG-IMP: NEGATIVE
BILIRUB UR QL STRIP: NEGATIVE
CANDIDA DNA VAG QL NAA+PROBE: NOT DETECTED
CANDIDA GLABRATA / CANDIDA KRUSEI DNA: NOT DETECTED
COLOR UR AUTO: ABNORMAL
GLUCOSE UR STRIP-MCNC: NEGATIVE MG/DL
HGB UR QL STRIP: ABNORMAL
KETONES UR STRIP-MCNC: NEGATIVE MG/DL
LEUKOCYTE ESTERASE UR QL STRIP: ABNORMAL
MUCOUS THREADS #/AREA URNS LPF: PRESENT /LPF
NITRATE UR QL: NEGATIVE
PH UR STRIP: 5.5 [PH] (ref 5–9)
RBC URINE: 6 /HPF
SP GR UR STRIP: 1.01 (ref 1–1.03)
SQUAMOUS EPITHELIAL: 2 /HPF
T VAGINALIS DNA VAG QL NAA+PROBE: NOT DETECTED
UROBILINOGEN UR STRIP-MCNC: NORMAL MG/DL
WBC URINE: 8 /HPF

## 2024-08-15 PROCEDURE — 87086 URINE CULTURE/COLONY COUNT: CPT | Mod: ZL | Performed by: NURSE PRACTITIONER

## 2024-08-15 PROCEDURE — 87186 SC STD MICRODIL/AGAR DIL: CPT | Mod: ZL | Performed by: NURSE PRACTITIONER

## 2024-08-15 PROCEDURE — 0352U MULTIPLEX VAGINAL PANEL BY PCR: CPT | Mod: ZL | Performed by: NURSE PRACTITIONER

## 2024-08-15 PROCEDURE — G0463 HOSPITAL OUTPT CLINIC VISIT: HCPCS

## 2024-08-15 PROCEDURE — 99214 OFFICE O/P EST MOD 30 MIN: CPT | Performed by: NURSE PRACTITIONER

## 2024-08-15 PROCEDURE — 81001 URINALYSIS AUTO W/SCOPE: CPT | Mod: ZL | Performed by: NURSE PRACTITIONER

## 2024-08-15 RX ORDER — CEPHALEXIN 500 MG/1
500 CAPSULE ORAL 2 TIMES DAILY
Qty: 14 CAPSULE | Refills: 0 | Status: SHIPPED | OUTPATIENT
Start: 2024-08-15 | End: 2024-08-22

## 2024-08-15 ASSESSMENT — PAIN SCALES - GENERAL: PAINLEVEL: NO PAIN (0)

## 2024-08-15 NOTE — NURSING NOTE
Chief Complaint   Patient presents with    Vaginal Problem         Medication Reconciliation: complete    Carolina Moore, LPN

## 2024-08-15 NOTE — PROGRESS NOTES
S: Ms. Ada Bal is a 69 year old  here for possible vaginal infection.  I last saw patient on 2024 for evaluation of vaginal prolapse.  Patient was fitted and had #4 ring pessary with support placed and tolerated this well.     Patient also has had chronic intermittent vaginal itching with external vaginal sores.  Clinical exam was concerning for lichen sclerosis and patient was recommended to use clobetasol ointment twice daily for 14 days then daily at bedtime until her follow-up next month.  She has been using this and states her vaginal itching has resolved.    Patient is concerned as she has been having increased amount of vaginal discharge that has been yellow/light brown in color.  This started about 5 days ago.  No vaginal pain.  No bleeding.    O:  /74   Pulse 64   Resp 16   Wt 70.4 kg (155 lb 4.8 oz)   Breastfeeding No   BMI 27.51 kg/m    Gen: Well-appearing, NAD  Pulm: nonlabored  Pelvic:  Normal appearing external female genitalia. Normal hair distribution. Vagina is without lesions.  Thick, white/yellow vaginal discharge. Cervix normal, no lesions, no cervical motion tenderness. Uterus is small, mobile, non-tender. No adnexal tenderness or masses.  #4 Ring Pessary with support removed. MVP collected.    #5 pessary with support re-inserted    A/P:  Ms. Ada Bal is a 69 year old  here for increased vaginal discharge after pessary placement on 2024.   -Recommend ruling out possible vaginal infection. MVP collected to rule out infection. Will treat as needed.   -In addition to his vaginal itching she has had some suprapubic pressure intermittently.  Will obtain urinalysis to rule out urinary tract infection.  Preliminary urinalysis returned with trace hematuria and large amount of leukocyte esterase.  Will start patient on Keflex twice daily for 7 days while urine culture processes.  Urine culture returned growing group B strep.    -During exam vaginal  prolapse was not supported with #4 ring pessary and we increased size to #5 pessary with support.  Patient tolerated this well.  Recommend patient follow-up for pessary check in 1 month, or sooner if concerns.    Patient is going to meet with a surgeon at an outside facility on 8/28/24 to discuss vaginal prolapse repair/bladder sling and hysterectomy.  She also has a surgical consult at [facility] in November 2024.    Jodie Barrett NP  8/15/2024 3:35 PM

## 2024-08-18 LAB — BACTERIA UR CULT: ABNORMAL

## 2024-08-21 NOTE — PATIENT INSTRUCTIONS
If you have any questions regarding your visit, Please contact your care team.     [facility]: [phone]    To Schedule an Appointment 24/7  Call: [phone] TELEPH[facility]BER     Tj Trinidad MD  Medical Director        Paula-KYRIE Lopez-RN  Yane Bennett-Surgery Scheduler  Amanda-Surgery Scheduler               Tuesday-[place]  7:30 a.m-4:30 p.m    Thursday-[place]  7:30 a.m-4:30 p.m    Typical Surgery Days: Tuesday or Friday [facility]  [address] [place] [address]  PH: [phone]    Imaging Scheduling all locations  PH: [phone]     [facility]  [address] [place] [address] [place] [address]  PH: [phone]    [facility]  [address] [place] [address]  PH: [phone] Fax      **Surgeries** Our Surgery Schedulers will contact you to schedule. If you do not receive a call within 3 business days, please call [phone].    Urgent Care locations:  [facility] Monday-Friday  10 am - 8 pm  Saturday and Sunday   9 am - 5 pm  Monday-Friday   10 am - 8 pm  Saturday and Sunday   9 am - 5 pm   [phone]   If you need a medication refill, please contact your pharmacy. Please allow 3 business days for your refill to be completed.  As always, Thank you for trusting us with your healthcare needs!    see additional instructions from your care team below

## 2024-08-28 ENCOUNTER — OFFICE VISIT (OUTPATIENT)
Dept: OBGYN | Facility: CLINIC | Age: 69
End: 2024-08-28
Payer: COMMERCIAL

## 2024-08-28 VITALS
WEIGHT: 154 LBS | DIASTOLIC BLOOD PRESSURE: 84 MMHG | HEART RATE: 56 BPM | OXYGEN SATURATION: 98 % | BODY MASS INDEX: 27.28 KG/M2 | SYSTOLIC BLOOD PRESSURE: 148 MMHG

## 2024-08-28 DIAGNOSIS — N81.2 CYSTOCELE WITH SECOND DEGREE UTERINE PROLAPSE: Primary | ICD-10-CM

## 2024-08-28 PROCEDURE — 99204 OFFICE O/P NEW MOD 45 MIN: CPT | Performed by: OBSTETRICS & GYNECOLOGY

## 2024-08-28 NOTE — PROGRESS NOTES
Ada is a 69 year old   is here today complaining of pelvic prolapse.  This has been a problem for some time.  It initially presented with pressure and low back pain.  She was seen by primary care and they placed a pessary, which helped the back pain, but now she has significant discharge.       ROS: Pertinent ROS as above.    Gyn Hx:     Past Medical History:   Diagnosis Date    Encounter for full-term uncomplicated delivery     x2    Migraine without status migrainosus, not intractable     No Comments Provided    Premenstrual tension syndrome     anxiety and irritability     Past Surgical History:   Procedure Laterality Date    PARATHYROIDECTOMY Left 05/15/2023    [employer] - left side parathyroid adenoma.    TONSILLECTOMY, ADENOIDECTOMY, COMBINED               ALL/Meds: Her medication and allergy histories were reviewed and are documented in their appropriate chart areas.    SH: Reviewed and documented in the appropriate area of the chart.  FH:  Her family history is reviewed and updated in the chart, today.  PMH: Her past medical, surgical, and obstetric histories were reviewed and updated today in the appropriate chart areas.    PE: BP (!) 148/84 (BP Location: Right arm, Patient Position: Sitting, Cuff Size: Adult Regular)   Pulse 56   Wt 69.9 kg (154 lb)   SpO2 98%   BMI 27.28 kg/m    Body mass index is 27.28 kg/m .    Pertinent Physical exam findings:    General Appearance:  healthy, alert, active, no distress    Pelvic:       - Ext: Vulva and perineum are normal without lesion, mass or discharge        - Urethra: normal without discharge or scarring mild hypermobility       - Bladder: no tenderness, no masses       - Vagina:  atrophic, with white and milky discharge, large cystocele, minimal rectocele, and MODERATE uterine descensus       - Cervix: normal       - Uterus:Normal shape, position and consistency       - Adnexa: Non palpable    I discussed pelvic support and how it can be damaged.   We discussed the association of the pelvic organs and how they are attached.  We discussed treatment options including kegel exercises, physical therapy and surgical repair of her particular issues.   She denies any bladder incontinence.  Discussed Sacral Colpopexy vs SACROSPINOUS LIGAMENT FIXATION.  Discussed the complications and longevity results.      A/P:(N81.2) Cystocele with second degree uterine prolapse  (primary encounter diagnosis)  Comment: 45 minutes spent on the date of the encounter doing chart review, review of outside records, patient visit, and documentation    Plan: Either a TOTAL LAPAROSCOPIC HYSTERECTOMY with or w/o a BILATERAL SALPINGO-OOPHRECTOMY and SACROSPINOUS LIGAMENT FIXATION vs Sacral Colpopexy (with referral to Urogyn)           - No orders of the defined types were placed in this encounter.

## 2024-08-30 ENCOUNTER — TELEPHONE (OUTPATIENT)
Dept: OBGYN | Facility: CLINIC | Age: 69
End: 2024-08-30
Payer: COMMERCIAL

## 2024-08-30 DIAGNOSIS — N81.2 CYSTOCELE WITH SECOND DEGREE UTERINE PROLAPSE: Primary | ICD-10-CM

## 2024-08-30 PROCEDURE — 99212 OFFICE O/P EST SF 10 MIN: CPT | Performed by: OBSTETRICS & GYNECOLOGY

## 2024-08-30 NOTE — TELEPHONE ENCOUNTER
"Pt last seen 8/28/2024 for cystocele w/ second degree uterine prolapse: \"Plan: Either a TOTAL LAPAROSCOPIC HYSTERECTOMY with or w/o a BILATERAL SALPINGO-OOPHRECTOMY and SACROSPINOUS LIGAMENT FIXATION vs Sacral Colpopexy (with referral to Urogyn) '    Pt desires to proceed with hysterectomy- RN routing to provider to advise.    Paula Foster RN on 8/30/2024 at 9:12 AM    "

## 2024-08-30 NOTE — TELEPHONE ENCOUNTER
[facility] Call [facility]    Phone Message    May a detailed message be left on voicemail: yes     Reason for Call: Other: Pt decided she wants to schedule a hysterectomy with Dr. Trinidad. Please place order and contact pt to schedule.      Action Taken: Message routed to:  Other: [facility]    Travel Screening: Not Applicable     Date of Service:

## 2024-09-05 NOTE — TELEPHONE ENCOUNTER
[facility] Call [facility]    Phone Message    May a detailed message be left on voicemail: no     Reason for Call: Other: Patient has not received an update on surgery scheduling.  Please reach out to patient.     Action Taken: Other: Womens    Travel Screening: Not Applicable     Date of Service:

## 2024-09-09 ENCOUNTER — TELEPHONE (OUTPATIENT)
Dept: OBGYN | Facility: CLINIC | Age: 69
End: 2024-09-09
Payer: COMMERCIAL

## 2024-09-09 NOTE — TELEPHONE ENCOUNTER
[facility] SURGERY PLANNING/SCHEDULING WORKSHEET                                                     Ada Bal                :  1955  MRN:  [id number]  Home Phone [phone]   Work Phone Not on file.   Mobile [phone]         Surgeon: Tj Trinidad MD     DIAGNOSIS:   Cystocele and Uterine prolapse     SURGICAL PROCEDURE:  TOTAL LAPAROSCOPIC HYSTERECTOMY BILATERAL SALPINGO-OOPHRECTOMY SACROSPINOUS LIGAMENT FIXATION      Surgery Location:  [facility]  Patient Surgery Class:  SDS  Length of Procedure:  120 minutes  Type of anesthesia:  General     Multi-surgeon case: Yes: Beatriz Rod Greil or Geovanna  Additional OR technician needed for assistance?:   No  Vendor needed: No  Positioning:  See Preference Card  Laterality:  NA  Date requested:         Special Equipment: None  Special Instructions for patient: Confirm if she wants her ovaries removed  Precautions:  NONE  :  NOT NEEDED     Sterilization consent:   Not needed.     Preop: Pre-op options: PCP  Pre-surgery consult needed:  Not applicable.  Postop evaluation needed:  2 weeks     ALLERGIES:   Allergies   No Known Allergies      BMI:There is no height or weight on file to calculate BMI.       The proposed surgical procedure is considered LOW risk.        Tj Trinidad MD    2024   SURGERY SCHEDULING AND PRECERTIFICATION    Medical Record Number: [id number]  Ada Bal  YOB: 1955   Phone: [phone] (home)   Primary Provider: Desiree Knag    Reason for Admit:  ICD-10 CODE:  N81.2    Surgeon: Tj Trinidad MD    Surgical Procedure: TOTAL LAPAROSCOPIC HYSTERECTOMY BILATERAL SALPINGO-OOPHRECTOMY SACROSPINOUS LIGAMENT FIXATION     Date of Surgery 10/16 Time of Surgery 12:30pm  Surgery to be performed at:  [facility]  Status: Outpatient  Type of Anesthesia Anticipated: General    Sterilization consent:  Not applicable to procedure being  performed.    Pre-Op: On 10/02 with Rona Lei at [facility]   COVID testing:  Per Provider's discretion Covid testing is not indicated.     Post-Op:  2 weeks on 10/30 with Dr Trinidad at [facility]    Pre-certification routed to Financial Counselors:  Yes    Surgery packet mailed to patient's home address: Yes  Patient instructed NPO 12 hours prior to surgery, arrive 1.5 hours  prior to surgery, must have a .  Patient understood and agrees to the plan.      Requestor:  Flaquita Moreno     Location:  [facility] [phone]

## 2024-09-09 NOTE — TELEPHONE ENCOUNTER
[facility] SURGERY PLANNING/SCHEDULING WORKSHEET                                                     Ada Bal                :  1955  MRN:  [id number]  Home Phone [phone]   Work Phone Not on file.   Mobile [phone]         Surgeon: Tj Trinidad MD    DIAGNOSIS:   Cystocele and Uterine prolapse    SURGICAL PROCEDURE:  TOTAL LAPAROSCOPIC HYSTERECTOMY BILATERAL SALPINGO-OOPHRECTOMY SACROSPINOUS LIGAMENT FIXATION     Surgery Location:  [facility]  Patient Surgery Class:  SDS  Length of Procedure:  120 minutes  Type of anesthesia:  General    Multi-surgeon case: Yes: Beatriz Rod Greil or Geovanna  Additional OR technician needed for assistance?:   No  Vendor needed: No  Positioning:  See Preference Card  Laterality:  NA  Date requested:        Special Equipment: None  Special Instructions for patient: Confirm if she wants her ovaries removed  Precautions:  NONE  :  NOT NEEDED    Sterilization consent:   Not needed.    Preop: Pre-op options: PCP  Pre-surgery consult needed:  Not applicable.  Postop evaluation needed:  2 weeks    ALLERGIES: No Known Allergies   BMI:There is no height or weight on file to calculate BMI.      The proposed surgical procedure is considered LOW risk.      Tj Trinidad MD    2024

## 2024-09-10 ENCOUNTER — MYC MEDICAL ADVICE (OUTPATIENT)
Dept: OBGYN | Facility: CLINIC | Age: 69
End: 2024-09-10
Payer: COMMERCIAL

## 2024-09-10 NOTE — TELEPHONE ENCOUNTER
"Central PA Note-no PA required, based on medical necessity     DOS: 10/16/24 OP     Primary Payor: Medicare     Secondary Payor: [employer]     Facility: [facility] NPI: [id number] TAX ID: [id number]   Provider:    Tj Trinidad MD                NPI: [id number]    Procedure Description: TOTAL LAPAROSCOPIC HYSTERECTOMY BILATERAL SALPINGO-OOPHRECTOMY SACROSPINOUS LIGAMENT FIXATION   Procedure CPTs: 42480, 77419  Source of CPTs:  Epic telephone encounter from the OB team   Diagnosis/ICDs: N81.2      Eligibility Status:  VÍCTOR Verified    Prior Auth Status:  Not Needed  Prior Auth Contact/Ref #:  CMS  [employer] policy: [email or web address]  Prior Auth Phone:  N/A  Prior Auth CPTs:  N/A  Prior Auth Number:  N/A  Prior Auth Date Range: N/A    Medical Necessity:   No Policy Exists  Policy Link:   N/A    Scanned into OnBase:           N/A    Followed Deferral Process: N/A    Denial Reason/Other Info: N/A     Please advise the patient to contact their insurance for coverage and benefits. Prior authorization or verified \"no prior authorization required\" is not a guarantee of payment or coverage. Payment is based on the patient's benefit plan documents and the adherance to said documents at the time of service.      DOMINIK Dash  Authorization Specialist II  [facility]   P: [phone]  F: [phone]    For an estimate:   patients can contact the Cost of Care department at:  Phone: [phone] or online: [email or web address].     For  [employer] billing questions:   patients should contact the number listed on their bill or contact [phone].       "

## 2024-10-02 ENCOUNTER — OFFICE VISIT (OUTPATIENT)
Dept: FAMILY MEDICINE | Facility: OTHER | Age: 69
End: 2024-10-02
Attending: PHYSICIAN ASSISTANT
Payer: COMMERCIAL

## 2024-10-02 VITALS
HEART RATE: 64 BPM | HEIGHT: 63 IN | SYSTOLIC BLOOD PRESSURE: 110 MMHG | WEIGHT: 153 LBS | DIASTOLIC BLOOD PRESSURE: 78 MMHG | RESPIRATION RATE: 20 BRPM | BODY MASS INDEX: 27.11 KG/M2 | OXYGEN SATURATION: 96 % | TEMPERATURE: 98.2 F

## 2024-10-02 DIAGNOSIS — Z01.818 PRE-OP EXAM: Primary | ICD-10-CM

## 2024-10-02 DIAGNOSIS — N81.2 CYSTOCELE WITH SECOND DEGREE UTERINE PROLAPSE: ICD-10-CM

## 2024-10-02 LAB
ANION GAP SERPL CALCULATED.3IONS-SCNC: 8 MMOL/L (ref 7–15)
BASOPHILS # BLD AUTO: 0 10E3/UL (ref 0–0.2)
BASOPHILS NFR BLD AUTO: 1 %
BUN SERPL-MCNC: 15.9 MG/DL (ref 8–23)
CALCIUM SERPL-MCNC: 9.8 MG/DL (ref 8.8–10.4)
CHLORIDE SERPL-SCNC: 102 MMOL/L (ref 98–107)
CREAT SERPL-MCNC: 0.84 MG/DL (ref 0.51–0.95)
EGFRCR SERPLBLD CKD-EPI 2021: 74 ML/MIN/1.73M2
EOSINOPHIL # BLD AUTO: 0.1 10E3/UL (ref 0–0.7)
EOSINOPHIL NFR BLD AUTO: 2 %
ERYTHROCYTE [DISTWIDTH] IN BLOOD BY AUTOMATED COUNT: 12.6 % (ref 10–15)
GLUCOSE SERPL-MCNC: 91 MG/DL (ref 70–99)
HCO3 SERPL-SCNC: 30 MMOL/L (ref 22–29)
HCT VFR BLD AUTO: 45.3 % (ref 35–47)
HGB BLD-MCNC: 15.1 G/DL (ref 11.7–15.7)
IMM GRANULOCYTES # BLD: 0 10E3/UL
IMM GRANULOCYTES NFR BLD: 0 %
LYMPHOCYTES # BLD AUTO: 1.3 10E3/UL (ref 0.8–5.3)
LYMPHOCYTES NFR BLD AUTO: 26 %
MCH RBC QN AUTO: 31.8 PG (ref 26.5–33)
MCHC RBC AUTO-ENTMCNC: 33.3 G/DL (ref 31.5–36.5)
MCV RBC AUTO: 95 FL (ref 78–100)
MONOCYTES # BLD AUTO: 0.7 10E3/UL (ref 0–1.3)
MONOCYTES NFR BLD AUTO: 14 %
NEUTROPHILS # BLD AUTO: 2.8 10E3/UL (ref 1.6–8.3)
NEUTROPHILS NFR BLD AUTO: 57 %
NRBC # BLD AUTO: 0 10E3/UL
NRBC BLD AUTO-RTO: 0 /100
PLATELET # BLD AUTO: 201 10E3/UL (ref 150–450)
POTASSIUM SERPL-SCNC: 4.2 MMOL/L (ref 3.4–5.3)
RBC # BLD AUTO: 4.75 10E6/UL (ref 3.8–5.2)
SODIUM SERPL-SCNC: 140 MMOL/L (ref 135–145)
WBC # BLD AUTO: 4.9 10E3/UL (ref 4–11)

## 2024-10-02 PROCEDURE — G0463 HOSPITAL OUTPT CLINIC VISIT: HCPCS

## 2024-10-02 PROCEDURE — 99214 OFFICE O/P EST MOD 30 MIN: CPT | Performed by: PHYSICIAN ASSISTANT

## 2024-10-02 PROCEDURE — 93005 ELECTROCARDIOGRAM TRACING: CPT | Performed by: PHYSICIAN ASSISTANT

## 2024-10-02 PROCEDURE — 85004 AUTOMATED DIFF WBC COUNT: CPT | Mod: ZL | Performed by: PHYSICIAN ASSISTANT

## 2024-10-02 PROCEDURE — 36415 COLL VENOUS BLD VENIPUNCTURE: CPT | Mod: ZL | Performed by: PHYSICIAN ASSISTANT

## 2024-10-02 PROCEDURE — 85014 HEMATOCRIT: CPT | Mod: ZL | Performed by: PHYSICIAN ASSISTANT

## 2024-10-02 PROCEDURE — 82310 ASSAY OF CALCIUM: CPT | Mod: ZL | Performed by: PHYSICIAN ASSISTANT

## 2024-10-02 PROCEDURE — 80048 BASIC METABOLIC PNL TOTAL CA: CPT | Mod: ZL | Performed by: PHYSICIAN ASSISTANT

## 2024-10-02 PROCEDURE — 93010 ELECTROCARDIOGRAM REPORT: CPT | Performed by: INTERNAL MEDICINE

## 2024-10-02 ASSESSMENT — PAIN SCALES - GENERAL: PAINLEVEL: NO PAIN (0)

## 2024-10-02 NOTE — PROGRESS NOTES
Preoperative Evaluation  [facility]  [address]  [place] [address]  Phone: [phone]  Fax: [phone]  Primary Provider: Desiree Kang MD  Pre-op Performing Provider: Rona Lei PA-C  Oct 2, 2024         9/30/2024   Surgical Information   What procedure is being done? TOTAL LAPAROSCOPIC HYSTERECTOMY BILATERAL SALPINGO-OOPHRECTOMY SACROSPINOUS LIGAMENT FIXATION    Facility or Hospital where procedure/surgery will be performed: [facility]   Who is doing the procedure / surgery? Dr. Trinidad   Date of surgery / procedure: Wed., 10/16/2024   Time of surgery / procedure: 12:30 p.m.   Where do you plan to recover after surgery? at home with family      Fax number for surgical facility: Note does not need to be faxed, will be available electronically in Epic.    Assessment & Plan     The proposed surgical procedure is considered INTERMEDIATE risk.      ICD-10-CM    1. Pre-op exam  Z01.818 EKG 12-lead, tracing only (Same Day)     CBC and Differential     Basic Metabolic Panel     CBC and Differential     Basic Metabolic Panel      2. Cystocele with second degree uterine prolapse  N81.2         Vital signs are stable. Patient presented for preoperative evaluation prior to upcoming proposed procedure. Lab work updated today including CBC and BMP. CBC - hemoglobin 15.1, hematocrit 45.3. BMP - GFR 74, creatinine 0.84. EKG indicated and ordered. Reviewed hold times as outlined below. Patient is in agreement and understanding of the above treatment plan. All questions and concerns were addressed and answered to patient's satisfaction. AVS reviewed with patient.   Cystocele with second degree prolapse - rational for upcoming proposed procedure. Patient aware of risks and benefits. Surgical soap if applicable was provided. Patient will follow postoperative with surgeon.      - No identified additional risk factors other than previously addressed    Preoperative  Medication Instructions  - Hold - vitamins and supplements for 10-14 days prior to surgery  - Hold NSAIDS (Ibuprofen, Naproxen, Aleve) for 7-days prior to surgery. Tylenol is OK.     Recommendation  Approval given to proceed with proposed procedure, without further diagnostic evaluation.    Lennox Caballero is a 69 year old, presenting for the following:  Pre-Op Exam          10/2/2024    10:59 AM   Additional Questions   Roomed by za blas lpn     HPI related to upcoming procedure: Ada presents to the clinic today for preoperative evaluation prior to upcoming proposed surgery with Dr. Vivi MD for cystocele repair.         9/30/2024   Pre-Op Questionnaire   Have you ever had a heart attack or stroke? No   Have you ever had surgery on your heart or blood vessels, such as a stent placement, a coronary artery bypass, or surgery on an artery in your head, neck, heart, or legs? No   Do you have chest pain with activity? No   Do you have a history of heart failure? No   Do you currently have a cold, bronchitis or symptoms of other infection? No   Do you have a cough, shortness of breath, or wheezing? No   Do you or anyone in your family have previous history of blood clots? No   Do you or does anyone in your family have a serious bleeding problem such as prolonged bleeding following surgeries or cuts? No   Have you ever had problems with anemia or been told to take iron pills? No   Have you had any abnormal blood loss such as black, tarry or bloody stools, or abnormal vaginal bleeding? No   Have you ever had a blood transfusion? No   Are you willing to have a blood transfusion if it is medically needed before, during, or after your surgery? Yes   Have you or any of your relatives ever had problems with anesthesia? No   Do you have sleep apnea, excessive snoring or daytime drowsiness? No   Do you have any artifical heart valves or other implanted medical devices like a pacemaker, defibrillator, or continuous  glucose monitor? No   Do you have artificial joints? No   Are you allergic to latex? No        Health Care Directive  Patient has a Health Care Directive on file    Preoperative Review of    reviewed - no record of controlled substances prescribed.    Status of Chronic Conditions:  See problem list for active medical problems.  Problems all longstanding and stable, except as noted/documented.  See ROS for pertinent symptoms related to these conditions.    Patient Active Problem List    Diagnosis Date Noted    Cystocele with second degree uterine prolapse 08/28/2024     Priority: Medium    Hyperparathyroidism (H) 09/20/2021     Priority: Medium    Serum calcium elevated 09/20/2021     Priority: Medium    Routine general medical examination at a health care facility 10/27/2010     Priority: Medium    Migraine headache 08/01/1999     Priority: Medium     Overview:   with secondary neurologic symptoms of expressive aphasia and speech   difficulties, resolved        Past Medical History:   Diagnosis Date    Encounter for full-term uncomplicated delivery     x2    Migraine without status migrainosus, not intractable     No Comments Provided    Premenstrual tension syndrome     anxiety and irritability     Past Surgical History:   Procedure Laterality Date    PARATHYROIDECTOMY Left 05/15/2023    [facility] left side parathyroid adenoma.    TONSILLECTOMY, ADENOIDECTOMY, COMBINED      1967     Current Outpatient Medications   Medication Sig Dispense Refill    aspirin EC 81 MG EC tablet Take 81 mg by mouth      fish oil-omega-3 fatty acids 1000 MG capsule Take 1 capsule by mouth      tiZANidine (ZANAFLEX) 4 MG tablet Take 1 tablet (4 mg) by mouth 3 times daily as needed for muscle spasms or other (neck pain) (Patient not taking: Reported on 8/28/2024) 30 tablet 11    triamcinolone (KENALOG) 0.1 % external cream APPLY TWICE DAILY TO RASH ON LEG TWICE DAILY AS NEEDED. STOP WHEN CLEAR. RESUME AS NEEDED FOR FLARES. DO NOT USE  "ON FACE, ARMPITS, OR GROI      vitamin D3 (CHOLECALCIFEROL) 50 mcg (2000 units) tablet Take 1 tablet (50 mcg) by mouth daily         No Known Allergies     Social History     Tobacco Use    Smoking status: Never    Smokeless tobacco: Never   Substance Use Topics    Alcohol use: Yes     Comment: occassional     Family History   Problem Relation Age of Onset    Heart Disease Mother         Heart Disease    Other - See Comments Mother         Dementia at time of death    Heart Disease Father         Heart Disease, of complications of CAD at age 77, s/p bypass surgery    Diabetes Type 2  Brother     Diabetes Type 2  Brother 72         of hypothermia after he fell in garage    Uterine Cancer Maternal Grandmother 50         of cancer    Heart Disease Maternal Grandfather     Heart Disease Paternal Grandmother 72    Heart Disease Paternal Grandfather 60    Breast Cancer No family hx of         Cancer-breast    Colon Cancer No family hx of     Thyroid Disease No family hx of      History   Drug Use No     Review of Systems  Constitutional, HEENT, cardiovascular, pulmonary, GI, , musculoskeletal, neuro, skin, endocrine and psych systems are negative, except as otherwise noted.      Objective    /78 (BP Location: Left arm, Patient Position: Sitting, Cuff Size: Adult Regular)   Pulse 64   Temp 98.2  F (36.8  C) (Tympanic)   Resp 20   Ht 1.594 m (5' 2.75\")   Wt 69.4 kg (153 lb)   SpO2 96%   BMI 27.32 kg/m     Estimated body mass index is 27.32 kg/m  as calculated from the following:    Height as of this encounter: 1.594 m (5' 2.75\").    Weight as of this encounter: 69.4 kg (153 lb).  Physical Exam  GENERAL: alert and no distress  EYES: Eyes grossly normal to inspection  RESP: lungs clear to auscultation - no rales, rhonchi or wheezes  CV: regular rate and rhythm, normal S1 S2, no S3 or S4, no murmur, click or rub, no peripheral edema  MS: no gross musculoskeletal defects noted, no edema  SKIN: no " suspicious lesions or rashes  NEURO: Normal strength and tone, mentation intact and speech normal  PSYCH: mentation appears normal, affect normal/bright  LYMPH: normal ant/post cervical, supraclavicular nodes    Recent Labs   Lab Test 11/24/23  0725   HGB 14.5         POTASSIUM 4.2   CR 0.91      Diagnostics  Recent Results (from the past 24 hour(s))   EKG 12-lead, tracing only (Same Day)    Collection Time: 10/02/24 11:35 AM   Result Value Ref Range    Systolic Blood Pressure  mmHg    Diastolic Blood Pressure  mmHg    Ventricular Rate 81 BPM    Atrial Rate 81 BPM    CA Interval 142 ms    QRS Duration 66 ms     ms    QTc 436 ms    P Axis 59 degrees    R AXIS -18 degrees    T Axis 17 degrees    Interpretation ECG       Sinus rhythm with occasional Premature ventricular complexes and Premature atrial complexes  Otherwise normal ECG  No previous ECGs available     Basic Metabolic Panel    Collection Time: 10/02/24 11:37 AM   Result Value Ref Range    Sodium 140 135 - 145 mmol/L    Potassium 4.2 3.4 - 5.3 mmol/L    Chloride 102 98 - 107 mmol/L    Carbon Dioxide (CO2) 30 (H) 22 - 29 mmol/L    Anion Gap 8 7 - 15 mmol/L    Urea Nitrogen 15.9 8.0 - 23.0 mg/dL    Creatinine 0.84 0.51 - 0.95 mg/dL    GFR Estimate 74 >60 mL/min/1.73m2    Calcium 9.8 8.8 - 10.4 mg/dL    Glucose 91 70 - 99 mg/dL   CBC with platelets and differential    Collection Time: 10/02/24 11:37 AM   Result Value Ref Range    WBC Count 4.9 4.0 - 11.0 10e3/uL    RBC Count 4.75 3.80 - 5.20 10e6/uL    Hemoglobin 15.1 11.7 - 15.7 g/dL    Hematocrit 45.3 35.0 - 47.0 %    MCV 95 78 - 100 fL    MCH 31.8 26.5 - 33.0 pg    MCHC 33.3 31.5 - 36.5 g/dL    RDW 12.6 10.0 - 15.0 %    Platelet Count 201 150 - 450 10e3/uL    % Neutrophils 57 %    % Lymphocytes 26 %    % Monocytes 14 %    % Eosinophils 2 %    % Basophils 1 %    % Immature Granulocytes 0 %    NRBCs per 100 WBC 0 <1 /100    Absolute Neutrophils 2.8 1.6 - 8.3 10e3/uL    Absolute Lymphocytes  1.3 0.8 - 5.3 10e3/uL    Absolute Monocytes 0.7 0.0 - 1.3 10e3/uL    Absolute Eosinophils 0.1 0.0 - 0.7 10e3/uL    Absolute Basophils 0.0 0.0 - 0.2 10e3/uL    Absolute Immature Granulocytes 0.0 <=0.4 10e3/uL    Absolute NRBCs 0.0 10e3/uL      EKG: appears normal, NSR, occasional PACs and PVCs, no LVH by voltage criteria, unchanged from previous tracings    Revised Cardiac Risk Index (RCRI)  The patient has the following serious cardiovascular risks for perioperative complications:   - No serious cardiac risks = 0 points     RCRI Interpretation: 0 points: Class I (very low risk - 0.4% complication rate)    Signed Electronically by: Rona Lei PA-C  A copy of this evaluation report is provided to the requesting physician.

## 2024-10-02 NOTE — NURSING NOTE
"Patient presents to the clinic for pre-op.    FOOD SECURITY SCREENING QUESTIONS:    The next two questions are to help us understand your food security.  If you are feeling you need any assistance in this area, we have resources available to support you today.    Hunger Vital Signs:  Within the past 12 months we worried whether our food would run out before we got money to buy more. Never  Within the past 12 months the food we bought just didn't last and we didn't have money to get more. Never    Advance Care Directive on file? yes  Advance Care Directive provided to patient? Declined.      Chief Complaint   Patient presents with    Pre-Op Exam       Initial /78 (BP Location: Left arm, Patient Position: Sitting, Cuff Size: Adult Regular)   Pulse 64   Temp 98.2  F (36.8  C) (Tympanic)   Resp 20   Ht 1.594 m (5' 2.75\")   Wt 69.4 kg (153 lb)   SpO2 96%   BMI 27.32 kg/m   Estimated body mass index is 27.32 kg/m  as calculated from the following:    Height as of this encounter: 1.594 m (5' 2.75\").    Weight as of this encounter: 69.4 kg (153 lb).  Medication Reconciliation: complete        Mandie Toney LPN     "

## 2024-10-02 NOTE — PATIENT INSTRUCTIONS
How to Take Your Medication Before Surgery  Preoperative Medication Instructions   - Hold - vitamins and supplements for 10-14 days prior to surgery  - Hold NSAIDS (Ibuprofen, Naproxen, Aleve) for 7-days prior to surgery. Tylenol is OK.     Patient Education   Preparing for Your Surgery  For Adults  Getting started  In most cases, a nurse will call to review your health history and instructions. They will give you an arrival time based on your scheduled surgery time. Please be ready to share:  Your doctor's clinic name and phone number  Your medical, surgical, and anesthesia history  A list of allergies and sensitivities  A list of medicines, including herbal treatments and over-the-counter drugs  Whether the patient has a legal guardian (ask how to send us the papers in advance)  Note: You may not receive a call if you were seen at our PAC (Preoperative Assessment Center).  Please tell us if you're pregnant--or if there's any chance you might be pregnant. Some surgeries may injure a fetus (unborn baby), so they require a pregnancy test. Surgeries that are safe for a fetus don't always need a test, and you can choose whether to have one.   Preparing for surgery  Within 10 to 30 days of surgery: Have a pre-op exam (sometimes called an H&P, or History and Physical). This can be done at a clinic or pre-operative center.  If you're having a , you may not need this exam. Talk to your care team.  At your pre-op exam, talk to your care team about all medicines you take. (This includes CBD oil and any drugs, such as THC, marijuana, and other forms of cannabis.) If you need to stop any medicine before surgery, ask when to start taking it again.  This is for your safety. Many medicines and drugs can make you bleed too much during surgery. Some change how well surgery (anesthesia) drugs work.  Call your insurance company to let them know you're having surgery. (If you don't have insurance, call [phone].)  Call  your clinic if there's any change in your health. This includes a scrape or scratch near the surgery site, or any signs of a cold (sore throat, runny nose, cough, rash, fever).  Eating and drinking guidelines  For your safety: Unless your surgeon tells you otherwise, follow the guidelines below.  Eat and drink as normal until 8 hours before you arrive for surgery. After that, no food or milk. You can spit out gum when you arrive.  Drink clear liquids until 2 hours before you arrive. These are liquids you can see through, like water, Gatorade, and Propel Water. They also include plain black coffee and tea (no cream or milk).  No alcohol for 24 hours before you arrive. The night before surgery, stop any drinks that contain THC.  If your care team tells you to take medicine on the morning of surgery, it's okay to take it with a sip of water. No other medicines or drugs are allowed (including CBD oil)--follow your care team's instructions.  If you have questions the day of surgery, call your hospital or surgery center.   Preventing infection  Shower or bathe the night before and the morning of surgery. Follow the instructions your clinic gave you. (If no instructions, use regular soap.)  Don't shave or clip hair near your surgery site. We'll remove the hair if needed.  Don't smoke or vape the morning of surgery. No chewing tobacco for 6 hours before you arrive. A nicotine patch is okay. You may spit out nicotine gum when you arrive.  For some surgeries, the surgeon will tell you to fully quit smoking and nicotine.  We will make every effort to keep you safe from infection. We will:  Clean our hands often with soap and water (or an alcohol-based hand rub).  Clean the skin at your surgery site with a special soap that kills germs.  Give you a special gown to keep you warm. (Cold raises the risk of infection.)  Wear hair covers, masks, gowns, and gloves during surgery.  Give antibiotic medicine, if prescribed. Not all  surgeries need this medicine.  What to bring on the day of surgery  Photo ID and insurance card  Copy of your health care directive, if you have one  Glasses and hearing aids (bring cases)  You can't wear contacts during surgery  Inhaler and eye drops, if you use them (tell us about these when you arrive)  CPAP machine or breathing device, if you use them  A few personal items, if spending the night  If you have . . .  A pacemaker, ICD (cardiac defibrillator), or other implant: Bring the ID card.  An implanted stimulator: Bring the remote control.  A legal guardian: Bring a copy of the certified (court-stamped) guardianship papers.  Please remove any jewelry, including body piercings. Leave jewelry and other valuables at home.  If you're going home the day of surgery  You must have a responsible adult drive you home. They should stay with you overnight as well.  If you don't have someone to stay with you, and you aren't safe to go home alone, we may keep you overnight. Insurance often won't pay for this.  After surgery  If it's hard to control your pain or you need more pain medicine, please call your surgeon's office.  Questions?   If you have any questions for your care team, list them here:   ____________________________________________________________________________________________________________________________________________________________________________________________________________________________________________________________  For informational purposes only. Not to replace the advice of your health care provider. Copyright   2003, 2019 [employer]. All rights reserved. Clinically reviewed by Channing Perea MD. [employer] [id number] - REV 08/24.

## 2024-10-06 LAB
ATRIAL RATE - MUSE: 81 BPM
DIASTOLIC BLOOD PRESSURE - MUSE: NORMAL MMHG
INTERPRETATION ECG - MUSE: NORMAL
P AXIS - MUSE: 59 DEGREES
PR INTERVAL - MUSE: 142 MS
QRS DURATION - MUSE: 66 MS
QT - MUSE: 376 MS
QTC - MUSE: 436 MS
R AXIS - MUSE: -18 DEGREES
SYSTOLIC BLOOD PRESSURE - MUSE: NORMAL MMHG
T AXIS - MUSE: 17 DEGREES
VENTRICULAR RATE- MUSE: 81 BPM

## 2024-10-21 NOTE — PATIENT INSTRUCTIONS
If you have any questions regarding your visit, Please contact your care team.     [email or web address] Services: [phone]    To Schedule an Appointment 24/7  Call: [phone] HOURS TELEPHONE NUMBER     Tj Trinidad MD  Medical Director        Paula-KYRIE Lopez-RN  Yane Bennett-Surgery Scheduler  Amanda-Surgery Scheduler               Tuesday-[place]  7:30 a.m-4:30 p.m    Thursday-[place]  7:30 a.m-4:30 p.m    Typical Surgery Days: Tuesday or Friday [facility]  [address] [place] [address]  PH: [phone]    Imaging Scheduling all locations  PH: [phone]     [facility] Labor and Delivery  [address] [place] [address] [place] [address]  PH: [phone]    [facility]  [address] [place] [address]  PH: [phone] Fax      **Surgeries** Our Surgery Schedulers will contact you to schedule. If you do not receive a call within 3 business days, please call [phone].    Urgent Care locations:  [facility] Monday-Friday  10 am - 8 pm  Saturday and Sunday   9 am - 5 pm  Monday-Friday   10 am - 8 pm  Saturday and Sunday   9 am - 5 pm   [phone]   If you need a medication refill, please contact your pharmacy. Please allow 3 business days for your refill to be completed.  As always, Thank you for trusting us with your healthcare needs!    see additional instructions from your care team below

## 2024-10-30 ENCOUNTER — OFFICE VISIT (OUTPATIENT)
Dept: OBGYN | Facility: CLINIC | Age: 69
End: 2024-10-30
Payer: COMMERCIAL

## 2024-10-30 VITALS
BODY MASS INDEX: 27.85 KG/M2 | HEART RATE: 71 BPM | SYSTOLIC BLOOD PRESSURE: 165 MMHG | DIASTOLIC BLOOD PRESSURE: 84 MMHG | WEIGHT: 156 LBS | OXYGEN SATURATION: 97 %

## 2024-10-30 DIAGNOSIS — Z98.890 POST-OPERATIVE STATE: Primary | ICD-10-CM

## 2024-10-30 PROCEDURE — 99024 POSTOP FOLLOW-UP VISIT: CPT | Performed by: OBSTETRICS & GYNECOLOGY

## 2024-10-30 NOTE — PROGRESS NOTES
Ada is a 69 year old  2 weeks s/p  LAPAROSCOPIC ASSISTED VAGINAL HYSTERECTOMY BILATERAL SALPINGO-OOPHRECTOMY SACROSPINOUS LIGAMENT FIXATION complicated by bloating, and slow bowel function.  She is currently requiring Acetominophen for pain management.  + vaginal bleeding, - hot flashes.  + GI/ complaints.  Energy level is Medium.  Denies fever.  Reviewed the pathology results Uterus, cervix, ovaries and fallopian tubes, hysterectomy and bilateral salpingoophorectomy -  Inactive endometrium.  Endometrial polyps.  Intramural leiomyoma.  Benign cervix, ovaries and fallopian tubes with no pathologic alterations.  No evidence of malignancy.  PE: There were no vitals taken for this visit.  Abd: soft, non tender, no masses  Incision: intact, no erythema, induration or discharge  vaginal cuff intact and non tender  Ext. Genitalia: Normal  Vagina:cuff healing, no lesions, Normal mucosa, no discharge  BME: no tenderness    A/P 2 weeks s/p surgery, doing well     1. Pelvic rest and no lifting for 4-6 weeks  Tj Trinidad MD

## 2024-11-18 NOTE — PATIENT INSTRUCTIONS
If you have any questions regarding your visit, Please contact your care team.     [employer]: [phone]    To Schedule an Appointment 24/7  Call: [phone] HOURS TELEPHONE NUMBER     Tj Trinidad MD  Medical Director        Paula-KYRIE Lopez-RN  Yane Bennett-Surgery Scheduler  Amanda-Surgery Scheduler               Tuesday-[place]  7:30 a.m-4:30 p.m    Thursday-[place]  7:30 a.m-4:30 p.m    Typical Surgery Days: Tuesday or Friday [facility]  [address] [place] [address]  PH: [phone]    Imaging Scheduling all locations  PH: [phone]     [facility] Labor and Delivery  [address] [place] [address] [place] [address]  PH: [phone]    [facility]  [address] [place] [address]  PH: [phone] Fax      **Surgeries** Our Surgery Schedulers will contact you to schedule. If you do not receive a call within 3 business days, please call [phone].    Urgent Care locations:  [facility] Monday-Friday  10 am - 8 pm  Saturday and Sunday   9 am - 5 pm  Monday-Friday   10 am - 8 pm  Saturday and Sunday   9 am - 5 pm   [phone]   If you need a medication refill, please contact your pharmacy. Please allow 3 business days for your refill to be completed.  As always, Thank you for trusting us with your healthcare needs!    see additional instructions from your care team below

## 2024-11-27 ENCOUNTER — OFFICE VISIT (OUTPATIENT)
Dept: OBGYN | Facility: CLINIC | Age: 69
End: 2024-11-27
Payer: COMMERCIAL

## 2024-11-27 VITALS
DIASTOLIC BLOOD PRESSURE: 73 MMHG | HEART RATE: 99 BPM | SYSTOLIC BLOOD PRESSURE: 123 MMHG | BODY MASS INDEX: 27.35 KG/M2 | OXYGEN SATURATION: 99 % | WEIGHT: 153.2 LBS

## 2024-11-27 DIAGNOSIS — Z98.890 POST-OPERATIVE STATE: Primary | ICD-10-CM

## 2024-11-27 NOTE — PROGRESS NOTES
Ada is a 69 year old  6 weeks s/p  LAPAROSCOPIC ASSISTED VAGINAL HYSTERECTOMY BILATERAL SALPINGO-OOPHRECTOMY SACROSPINOUS LIGAMENT FIXATION complicated by no problems reported.  She is currently requiring nothing for pain management.  - vaginal bleeding, - hot flashes.  - GI/ complaints.  Energy level is Medium.  Denies fever.    PE: /73 (BP Location: Right arm, Patient Position: Sitting, Cuff Size: Adult Regular)   Pulse 99   Wt 69.5 kg (153 lb 3.2 oz)   SpO2 99%   BMI 27.35 kg/m    Abd: soft, non tender, no masses  Incision: intact, no erythema, induration or discharge  vaginal cuff intact and non tender  Ext. Genitalia: Normal  Vagina:cuff healing, no lesions, Normal mucosa, no discharge  BME: good support    A/P 6 weeks s/p surgery, doing well     1. Pelvic rest for 2 more weeks, but can increase lifting as tolerated  Tj Trinidad MD   
English

## 2025-01-08 ENCOUNTER — TELEPHONE (OUTPATIENT)
Dept: OBGYN | Facility: CLINIC | Age: 70
End: 2025-01-08
Payer: COMMERCIAL

## 2025-01-08 NOTE — TELEPHONE ENCOUNTER
Called spoke with patient.     She will be back in [place] on Feb 3, 2025 & would like to see Dr. Trinidad for possible bladder prolapse.     Reports something starting to bulge out of her vagina.     Advised to call back with any new or worse symptoms. Unable to triage pt while she is out of state but, able to advise when to call 911 or go to ED.   Went over s/s of infection.    Also, suggested contacting insurance company to find out where she can be seen in [place] if the need arises. Insurance companies also have triage nurse pt's can speak with when pt's are out of state.     Sia BOO RN - [facility]

## 2025-01-08 NOTE — TELEPHONE ENCOUNTER
Health Call Center    Phone Message    May a detailed message be left on voicemail: yes     Reason for Call: Patients is calling with concerns about a prolapse as of recent. Had a hysterectomy in October. She is currently in [place] for the month and wondering if she needs to be seen there. Please advise. Thank you.     Action Taken: Message routed to:  Other: [facility]    Travel Screening: Not Applicable     Date of Service:

## 2025-01-23 NOTE — PATIENT INSTRUCTIONS
If you have any questions regarding your visit, Please contact your care team.     [employer] Services: [phone]    To Schedule an Appointment 24/7  Call: [phone] HOURS TELEPHONE NUMBER     Tj Trinidad MD  Medical Director        Paula-KYRIE Loepz-RN  Yane Bennett-Surgery Scheduler  Amanda-Surgery Scheduler               Tuesday-[place]  7:30 a.m-4:30 p.m    Thursday-[place]  7:30 a.m-4:30 p.m    Typical Surgery Days: Tuesday or Friday [facility]  [address] [place] [address]  PH: [phone]    Imaging Scheduling all locations  PH: [phone]     [facility] Labor and Delivery  [address] [place] [address] [place] [address]  PH: [phone]    [facility]  [address] [place] [address]  PH: [phone] Fax      **Surgeries** Our Surgery Schedulers will contact you to schedule. If you do not receive a call within 3 business days, please call [phone].    Urgent Care locations:  [facility] Monday-Friday  10 am - 8 pm  Saturday and Sunday   9 am - 5 pm  Monday-Friday   10 am - 8 pm  Saturday and Sunday   9 am - 5 pm   [phone]   If you need a medication refill, please contact your pharmacy. Please allow 3 business days for your refill to be completed.  As always, Thank you for trusting us with your healthcare needs!    see additional instructions from your care team below

## 2025-02-03 ENCOUNTER — OFFICE VISIT (OUTPATIENT)
Dept: OBGYN | Facility: CLINIC | Age: 70
End: 2025-02-03
Payer: COMMERCIAL

## 2025-02-03 VITALS
HEART RATE: 89 BPM | OXYGEN SATURATION: 98 % | SYSTOLIC BLOOD PRESSURE: 159 MMHG | BODY MASS INDEX: 28.03 KG/M2 | WEIGHT: 157 LBS | DIASTOLIC BLOOD PRESSURE: 89 MMHG

## 2025-02-03 DIAGNOSIS — N81.11 CYSTOCELE, MIDLINE: Primary | ICD-10-CM

## 2025-02-03 PROCEDURE — 99213 OFFICE O/P EST LOW 20 MIN: CPT | Performed by: OBSTETRICS & GYNECOLOGY

## 2025-02-03 NOTE — PROGRESS NOTES
Ada is a 70 year old   is here today complaining of recurrent prolapse.  This has been a problem for the past couple weeks.       ROS: Pertinent ROS as above.    Gyn Hx:     Past Medical History:   Diagnosis Date    Encounter for full-term uncomplicated delivery     x2    Migraine without status migrainosus, not intractable     No Comments Provided    Premenstrual tension syndrome     anxiety and irritability     Past Surgical History:   Procedure Laterality Date    PARATHYROIDECTOMY Left 05/15/2023    [place] - left side parathyroid adenoma.    TONSILLECTOMY, ADENOIDECTOMY, COMBINED      1967         ALL/Meds: Her medication and allergy histories were reviewed and are documented in their appropriate chart areas.    SH: Reviewed and documented in the appropriate area of the chart.  FH:  Her family history is reviewed and updated in the chart, today.  PMH: Her past medical, surgical, and obstetric histories were reviewed and updated today in the appropriate chart areas.    PE: BP (!) 159/89 (BP Location: Left arm, Patient Position: Sitting, Cuff Size: Adult Regular)   Pulse 89   Wt 71.2 kg (157 lb)   SpO2 98%   BMI 28.03 kg/m    Body mass index is 28.03 kg/m .    Pertinent Physical exam findings:    General Appearance:  healthy, alert, active, no distress    Pelvic:       - Ext: Vulva and perineum are normal without lesion, mass or discharge There is a vaginal bulge       - Vagina:  atrophic, moderate cystcele, and but the vaginal apex still seems fairly well supported  She asked about a pessary in the short term, but as the discomfort and back pain seem better, we will hold on a pessary.  I want to have her see Pelvic floor PT to see what strengthening her pelvic floor would do to improve her discomfort.      A/P:(N81.11) Cystocele, midline  (primary encounter diagnosis)  Comment:   Plan: Physical Therapy  Referral        She wants to see someone closer to home.  She has a friend who used  someone and she will find out who that was and let us know.  Tj Trinidad MD FACOG             -   Orders Placed This Encounter   Procedures    Physical Therapy  Referral

## 2025-02-05 ENCOUNTER — OFFICE VISIT (OUTPATIENT)
Dept: OBGYN | Facility: OTHER | Age: 70
End: 2025-02-05
Attending: NURSE PRACTITIONER
Payer: MEDICARE

## 2025-02-05 VITALS
BODY MASS INDEX: 27.5 KG/M2 | WEIGHT: 154 LBS | DIASTOLIC BLOOD PRESSURE: 80 MMHG | SYSTOLIC BLOOD PRESSURE: 134 MMHG | HEART RATE: 76 BPM

## 2025-02-05 DIAGNOSIS — R39.15 URINARY URGENCY: ICD-10-CM

## 2025-02-05 DIAGNOSIS — T81.89XA POSTOPERATIVE PROLAPSE OF VAGINAL WALL, INITIAL ENCOUNTER: ICD-10-CM

## 2025-02-05 DIAGNOSIS — N81.10 POSTOPERATIVE PROLAPSE OF VAGINAL WALL, INITIAL ENCOUNTER: ICD-10-CM

## 2025-02-05 DIAGNOSIS — Z46.89 ENCOUNTER FOR FITTING AND ADJUSTMENT OF PESSARY: ICD-10-CM

## 2025-02-05 DIAGNOSIS — N81.11 CYSTOCELE, MIDLINE: Primary | ICD-10-CM

## 2025-02-05 DIAGNOSIS — Z90.710 S/P HYSTERECTOMY: ICD-10-CM

## 2025-02-05 LAB
ALBUMIN UR-MCNC: NEGATIVE MG/DL
APPEARANCE UR: CLEAR
BILIRUB UR QL STRIP: NEGATIVE
COLOR UR AUTO: YELLOW
GLUCOSE UR STRIP-MCNC: NEGATIVE MG/DL
HGB UR QL STRIP: NEGATIVE
KETONES UR STRIP-MCNC: NEGATIVE MG/DL
LEUKOCYTE ESTERASE UR QL STRIP: NEGATIVE
MUCOUS THREADS #/AREA URNS LPF: PRESENT /LPF
NITRATE UR QL: NEGATIVE
PH UR STRIP: 5.5 [PH] (ref 5–9)
RBC URINE: 0 /HPF
SP GR UR STRIP: 1 (ref 1–1.03)
UROBILINOGEN UR STRIP-MCNC: NORMAL MG/DL
WBC URINE: 0 /HPF

## 2025-02-05 PROCEDURE — 81003 URINALYSIS AUTO W/O SCOPE: CPT | Mod: ZL | Performed by: NURSE PRACTITIONER

## 2025-02-05 ASSESSMENT — PAIN SCALES - GENERAL: PAINLEVEL_OUTOF10: NO PAIN (0)

## 2025-02-05 NOTE — PROGRESS NOTES
Follow up    CHIEF COMPLAINT: recurrent Prolapse    HPI:    Ada Bal is a 70 year old , here for follow up for vaginal prolapse office visit.  The last time I saw patient was 8/15/2024.  At that time vaginal prolapse was not supported with #4 ring pessary and we increased size to #5 pessary with support.  Patient tolerated this well.       Patient met with  Dr. Trinidad at [facility] in [place] on 24 to discuss vaginal prolapse repair/bladder sling and hysterectomy.    Patient underwent: Total laparoscopic hysterectomy bilateral salpingectomy-oophorectomy sacrospinous ligament fixation with Dr. Trinidad on 10/16/2024.  She followed with Dr. Trinidad for her suppurative visits.  She last saw him on 2/3/2025 for recurrent vaginal prolapse.     Can't get into PT until end of March-     No LMP recorded (lmp unknown). Patient has had a hysterectomy.    ROS: 10-Point ROS negative except as noted in HPI    Physical Exam  /80   Pulse 76   Wt 69.9 kg (154 lb)   LMP  (LMP Unknown)   Breastfeeding No   BMI 27.50 kg/m    Body mass index is 27.5 kg/m .  Gen: Well-appearing, well developed  Resp: nonlabored, normal effort, no audible wheezing  GI: soft, nontender, no flank pain  MS: moving without difficulty  Psych: appropriate mood and affect    Pelvic:  Normal appearing external female genitalia. Normal hair distribution. Vagina is without lesions. No discharge. Absent cervix and uterus. No adnexal tenderness or masses. Urethral mobility present.  Grade 2 cystocele, no rectocele. Chaperone present    PVR before pessary 357mL  PVR after pessary placement 4 mL    ASSESSMENT/PLAN  Ada Bal is a 70 year old  female here for ***  1. Cystocele, midline (Primary)  ***    2. Urinary urgency  ***  - UA with Microscopic reflex to Culture    3. S/P hysterectomy  ***    4. Postoperative prolapse of vaginal wall, initial encounter  ***    5. Encounter for fitting and adjustment  of pessary  ***     Jodie Barrett NP  [facility]    OB/GYN

## 2025-02-05 NOTE — NURSING NOTE
"Chief Complaint   Patient presents with    Vaginal Problem    Pessary Check/Fit/Insert   Patient is seen in clinic for a follow up on a bladder prolapse. Patient stated she saw her surgeon earlier this week but would like to continue care in [place]. Patient is also requesting her pessary be replaced.   Julieth Randall LPN on 2/5/2025 at 8:59 AM   Ext. 1184    Initial /80   Pulse 76   Wt 69.9 kg (154 lb)   LMP  (LMP Unknown)   Breastfeeding No   BMI 27.50 kg/m   Estimated body mass index is 27.5 kg/m  as calculated from the following:    Height as of 10/2/24: 1.594 m (5' 2.75\").    Weight as of this encounter: 69.9 kg (154 lb).  Medication Reconciliation: complete    Julieth Randall LPN    Post void after pessary trial placed 4 ml   "

## 2025-02-19 ENCOUNTER — OFFICE VISIT (OUTPATIENT)
Dept: OBGYN | Facility: OTHER | Age: 70
End: 2025-02-19
Attending: NURSE PRACTITIONER
Payer: MEDICARE

## 2025-02-19 VITALS
DIASTOLIC BLOOD PRESSURE: 64 MMHG | HEART RATE: 77 BPM | WEIGHT: 157 LBS | SYSTOLIC BLOOD PRESSURE: 112 MMHG | BODY MASS INDEX: 28.03 KG/M2

## 2025-02-19 DIAGNOSIS — R10.2 SUPRAPUBIC PRESSURE: Primary | ICD-10-CM

## 2025-02-19 DIAGNOSIS — N81.11 CYSTOCELE, MIDLINE: ICD-10-CM

## 2025-02-19 DIAGNOSIS — Z46.89 PESSARY MAINTENANCE: ICD-10-CM

## 2025-02-19 LAB
ALBUMIN UR-MCNC: NEGATIVE MG/DL
APPEARANCE UR: CLEAR
BILIRUB UR QL STRIP: NEGATIVE
COLOR UR AUTO: ABNORMAL
GLUCOSE UR STRIP-MCNC: NEGATIVE MG/DL
HGB UR QL STRIP: NEGATIVE
KETONES UR STRIP-MCNC: NEGATIVE MG/DL
LEUKOCYTE ESTERASE UR QL STRIP: ABNORMAL
NITRATE UR QL: NEGATIVE
PH UR STRIP: 5.5 [PH] (ref 5–9)
RBC URINE: 0 /HPF
SP GR UR STRIP: 1.01 (ref 1–1.03)
UROBILINOGEN UR STRIP-MCNC: NORMAL MG/DL
WBC URINE: <1 /HPF

## 2025-02-19 PROCEDURE — G0463 HOSPITAL OUTPT CLINIC VISIT: HCPCS

## 2025-02-19 PROCEDURE — 81001 URINALYSIS AUTO W/SCOPE: CPT | Mod: ZL | Performed by: NURSE PRACTITIONER

## 2025-02-19 ASSESSMENT — PAIN SCALES - GENERAL: PAINLEVEL_OUTOF10: NO PAIN (0)

## 2025-02-19 NOTE — NURSING NOTE
"Chief Complaint   Patient presents with    Pessary Check/Fit/Insert   Patient presents to OB/GYN clinic for a follow up on her pessary. Patient has changed and cleansed herself a few times since her last visit. Patient stated no concerns except for the most recent time she removed her pessary she has been feeling some discomfort with mild pink tinged discharge.   Julieth Randall LPN on 2/19/2025 at 8:59 AM      Initial /64   Pulse 77   Wt 71.2 kg (157 lb)   LMP  (LMP Unknown)   Breastfeeding No   BMI 28.03 kg/m   Estimated body mass index is 28.03 kg/m  as calculated from the following:    Height as of 10/2/24: 1.594 m (5' 2.75\").    Weight as of this encounter: 71.2 kg (157 lb).  Medication Reconciliation: complete    Julieth Randall LPN  "

## 2025-02-19 NOTE — PROGRESS NOTES
Follow-Up Visit    S: Ms. Ada Bal is a 70 year old here for pessary check.  Patient was fitted for pessary on 2/5/2025 management of recurrent cystocele.  This is post total laparoscopic hysterectomy bilateral salpingectomy oophorectomy sacrospinous ligament fixation with Dr. Trinidad in [facility] October 2024.    She has been doing well removing and replacing pessary at home independently.  For the past 3 days she started having more suprapubic pressure.  She noticed a small pink tinge discharge when wiping but this has subsided.  She feels she is emptying her bladder well.  There is still some white vaginal discharge and mild odor, but improved from her last visit.    Current Pessary:#3 ring pessary with support     O:  /64   Pulse 77   Wt 71.2 kg (157 lb)   LMP  (LMP Unknown)   Breastfeeding No   BMI 28.03 kg/m    Gen: Well-appearing, NAD  Pulm: nonlabored  Psych: appropriate mood and affect  Abdomen: Soft, active bowel sounds.  Suprapubic tenderness to palpation, no CVA tenderness.  Pelvic:  Normal appearing external female genitalia. Normal hair distribution. Vagina is without irritation, erythema or lesions.  Small white vaginal discharge.  Cervix and uterus absent.   Pessary was removed, cleaned and set aside. Chaperone present    A/P:  Ms. Ada Bal is a 70 year old here for 2 week pessary check.   - Pessary was successfully removed, cleaned and reinserted without difficult. No concerns on physical exam.    -She has been feeling increased suprapubic pressure/pain over the past few days-urinalysis collected to rule out urinary tract infection.  We discussed how she is at increased risk due to urinary retention.  Vital signs are stable, she has been afebrile.  Suprapubic pressure tenderness on exam, no CVA tenderness.  -Our bladder scanner was malfunctioning and unable to collect PVR today.  -Recommend close monitoring and follow-up if suprapubic pressure does not improve or  she develops any further concerning symptoms.  She will continue planning on removing and cleansing pessary twice weekly at home.  We will then have her return in 6 months for pessary check or sooner if she is having concerns.  -Patient understands that if cystocele is not managed with pessary and she does need a surgical consultation this would need to be completed at outside facility.    Follow up: 6 months for pessary care or sooner if concerns- Next visit- urine and PVR    Jodie Barrett NP  [facility]    2/19/2025

## 2025-02-20 LAB — BACTERIA UR CULT: ABNORMAL

## 2025-02-22 LAB — BACTERIA UR CULT: ABNORMAL

## 2025-02-27 SDOH — HEALTH STABILITY: PHYSICAL HEALTH: ON AVERAGE, HOW MANY MINUTES DO YOU ENGAGE IN EXERCISE AT THIS LEVEL?: 20 MIN

## 2025-02-27 SDOH — HEALTH STABILITY: PHYSICAL HEALTH: ON AVERAGE, HOW MANY DAYS PER WEEK DO YOU ENGAGE IN MODERATE TO STRENUOUS EXERCISE (LIKE A BRISK WALK)?: 3 DAYS

## 2025-02-27 ASSESSMENT — SOCIAL DETERMINANTS OF HEALTH (SDOH): HOW OFTEN DO YOU GET TOGETHER WITH FRIENDS OR RELATIVES?: MORE THAN THREE TIMES A WEEK

## 2025-03-04 ENCOUNTER — OFFICE VISIT (OUTPATIENT)
Dept: FAMILY MEDICINE | Facility: OTHER | Age: 70
End: 2025-03-04
Attending: FAMILY MEDICINE
Payer: MEDICARE

## 2025-03-04 VITALS
SYSTOLIC BLOOD PRESSURE: 124 MMHG | DIASTOLIC BLOOD PRESSURE: 80 MMHG | WEIGHT: 154.6 LBS | HEART RATE: 82 BPM | OXYGEN SATURATION: 97 % | RESPIRATION RATE: 16 BRPM | HEIGHT: 62 IN | BODY MASS INDEX: 28.45 KG/M2 | TEMPERATURE: 97 F

## 2025-03-04 DIAGNOSIS — Z00.00 ENCOUNTER FOR MEDICARE ANNUAL WELLNESS EXAM: Primary | ICD-10-CM

## 2025-03-04 DIAGNOSIS — N81.2 CYSTOCELE WITH SECOND DEGREE UTERINE PROLAPSE: ICD-10-CM

## 2025-03-04 DIAGNOSIS — E21.3 HYPERPARATHYROIDISM: ICD-10-CM

## 2025-03-04 DIAGNOSIS — M54.2 NECK PAIN: ICD-10-CM

## 2025-03-04 PROCEDURE — G0463 HOSPITAL OUTPT CLINIC VISIT: HCPCS

## 2025-03-04 RX ORDER — METHOCARBAMOL 500 MG/1
500 TABLET, FILM COATED ORAL 3 TIMES DAILY PRN
Qty: 30 TABLET | Refills: 11 | Status: SHIPPED | OUTPATIENT
Start: 2025-03-04

## 2025-03-04 ASSESSMENT — PAIN SCALES - GENERAL: PAINLEVEL_OUTOF10: NO PAIN (0)

## 2025-03-04 NOTE — PATIENT INSTRUCTIONS
Patient Education   Preventive Care Advice   This is general advice given by our system to help you stay healthy. However, your care team may have specific advice just for you. Please talk to your care team about your preventive care needs.  Nutrition  Eat 5 or more servings of fruits and vegetables each day.  Try wheat bread, brown rice and whole grain pasta (instead of white bread, rice, and pasta).  Get enough calcium and vitamin D. Check the label on foods and aim for 100% of the RDA (recommended daily allowance).  Lifestyle  Exercise at least 150 minutes each week  (30 minutes a day, 5 days a week).  Do muscle strengthening activities 2 days a week. These help control your weight and prevent disease.  No smoking.  Wear sunscreen to prevent skin cancer.  Have a dental exam and cleaning every 6 months.  Yearly exams  See your health care team every year to talk about:  Any changes in your health.  Any medicines your care team has prescribed.  Preventive care, family planning, and ways to prevent chronic diseases.  Shots (vaccines)   HPV shots (up to age 26), if you've never had them before.  Hepatitis B shots (up to age 59), if you've never had them before.  COVID-19 shot: Get this shot when it's due.  Flu shot: Get a flu shot every year.  Tetanus shot: Get a tetanus shot every 10 years.  Pneumococcal, hepatitis A, and RSV shots: Ask your care team if you need these based on your risk.  Shingles shot (for age 50 and up)  General health tests  Diabetes screening:  Starting at age 35, Get screened for diabetes at least every 3 years.  If you are younger than age 35, ask your care team if you should be screened for diabetes.  Cholesterol test: At age 39, start having a cholesterol test every 5 years, or more often if advised.  Bone density scan (DEXA): At age 50, ask your care team if you should have this scan for osteoporosis (brittle bones).  Hepatitis C: Get tested at least once in your life.  STIs (sexually  transmitted infections)  Before age 24: Ask your care team if you should be screened for STIs.  After age 24: Get screened for STIs if you're at risk. You are at risk for STIs (including HIV) if:  You are sexually active with more than one person.  You don't use condoms every time.  You or a partner was diagnosed with a sexually transmitted infection.  If you are at risk for HIV, ask about PrEP medicine to prevent HIV.  Get tested for HIV at least once in your life, whether you are at risk for HIV or not.  Cancer screening tests  Cervical cancer screening: If you have a cervix, begin getting regular cervical cancer screening tests starting at age 21.  Breast cancer scan (mammogram): If you've ever had breasts, begin having regular mammograms starting at age 40. This is a scan to check for breast cancer.  Colon cancer screening: It is important to start screening for colon cancer at age 45.  Have a colonoscopy test every 10 years (or more often if you're at risk) Or, ask your provider about stool tests like a FIT test every year or Cologuard test every 3 years.  To learn more about your testing options, visit:   .  For help making a decision, visit:   https://bit.ly/ty74001.  Prostate cancer screening test: If you have a prostate, ask your care team if a prostate cancer screening test (PSA) at age 55 is right for you.  Lung cancer screening: If you are a current or former smoker ages 50 to 80, ask your care team if ongoing lung cancer screenings are right for you.  For informational purposes only. Not to replace the advice of your health care provider. Copyright   2023 [employer]. All rights reserved. Clinically reviewed by the [facility] Transitions Program. [employer] 323109 - REV 01/24.

## 2025-03-04 NOTE — PROGRESS NOTES
Preventive Care Visit  [facility]  Desiree Kang MD, Family Medicine  Mar 4, 2025          Subjective   Ada is a 70 year old, presenting for the following:  Wellness Visit        3/4/2025     9:31 AM   Additional Questions   Roomed by Carolina Moore         Here for Medicare Wellness Visit now.    Her mom had a history of TIAs and later had alzheimer's.  She has had a history of headaches that made her have difficulty speaking.  Now she takes methocarbamol and sees Chiropractor, which helps her headaches.  Ada is wondering if she should continue aspirin given her age.  She has no other history of coronary artery disease, diabetes, cerebrovascular disease.    Had a hysterectomy last fall due to uterine prolapse.  Since the surgery, she has had issues with cystocele.  She had followed up with surgeon.  She is considering going to [place] to do further surgery.        Advance Care Planning  Patient has a Health Care Directive on file  Advance care planning document is on file and is current.      2/27/2025   General Health   How would you rate your overall physical health? Excellent   Feel stress (tense, anxious, or unable to sleep) Not at all         2/27/2025   Nutrition   Diet: Regular (no restrictions)         2/27/2025   Exercise   Days per week of moderate/strenous exercise 3 days   Average minutes spent exercising at this level 20 min         2/27/2025   Social Factors   Frequency of gathering with friends or relatives More than three times a week   Worry food won't last until get money to buy more No   Food not last or not have enough money for food? No   Do you have housing? (Housing is defined as stable permanent housing and does not include staying ouside in a car, in a tent, in an abandoned building, in an overnight shelter, or couch-surfing.) Yes   Are you worried about losing your housing? No   Lack of transportation? No   Unable to get utilities (heat,electricity)? No          2/27/2025   Fall Risk   Fallen 2 or more times in the past year? No   Trouble with walking or balance? No          2/27/2025   Activities of Daily Living- Home Safety   Needs help with the following daily activites None of the above   Safety concerns in the home None of the above         2/27/2025   Dental   Dentist two times every year? Yes         2/27/2025   Hearing Screening   Hearing concerns? None of the above         2/27/2025   Driving Risk Screening   Patient/family members have concerns about driving No         2/27/2025   General Alertness/Fatigue Screening   Have you been more tired than usual lately? No         2/27/2025   Urinary Incontinence Screening   Bothered by leaking urine in past 6 months No            Today's PHQ-2 Score:       3/4/2025     9:29 AM   PHQ-2 ( 1999 [employer])   Q1: Little interest or pleasure in doing things 0   Q2: Feeling down, depressed or hopeless 0   PHQ-2 Score 0    Q1: Little interest or pleasure in doing things Not at all   Q2: Feeling down, depressed or hopeless Not at all   PHQ-2 Score 0       Patient-reported           2/27/2025   Substance Use   Alcohol more than 3/day or more than 7/wk No   Do you have a current opioid prescription? No   How severe/bad is pain from 1 to 10? 0/10 (No Pain)   Do you use any other substances recreationally? No     Social History     Tobacco Use    Smoking status: Never    Smokeless tobacco: Never   Vaping Use    Vaping status: Never Used   Substance Use Topics    Alcohol use: Yes     Comment: occassional    Drug use: No           2/13/2024   LAST FHS-7 RESULTS   1st degree relative breast or ovarian cancer No   Any relative bilateral breast cancer No   Any male have breast cancer No   Any ONE woman have BOTH breast AND ovarian cancer No   Any woman with breast cancer before 50yrs No   2 or more relatives with breast AND/OR ovarian cancer No   2 or more relatives with breast AND/OR bowel cancer No        Mammogram Screening -  Mammogram every 1-2 years updated in Health Maintenance based on mutual decision making    ASCVD Risk   The 10-year ASCVD risk score (Angel BARDALES, et al., 2019) is: 9.8%    Values used to calculate the score:      Age: 70 years      Sex: Female      Is Non- : No      Diabetic: No      Tobacco smoker: No      Systolic Blood Pressure: 124 mmHg      Is BP treated: No      HDL Cholesterol: 48 mg/dL      Total Cholesterol: 175 mg/dL            Reviewed and updated as needed this visit by Provider                    Past Medical History:   Diagnosis Date    Encounter for full-term uncomplicated delivery     x2    Migraine without status migrainosus, not intractable     No Comments Provided    Premenstrual tension syndrome     anxiety and irritability     Past Surgical History:   Procedure Laterality Date    PARATHYROIDECTOMY Left 05/15/2023    Commiskey - left side parathyroid adenoma.    IN VAGINAL HYSTERECTOMY  10/16/2024    LAVH with BSO, lysis of adhesions, Dr. Tj Ramsey    TONSILLECTOMY, ADENOIDECTOMY, COMBINED      1967     BP Readings from Last 3 Encounters:   03/04/25 124/80   02/19/25 112/64   02/07/25 134/82    Wt Readings from Last 3 Encounters:   03/04/25 70.1 kg (154 lb 9.6 oz)   02/19/25 71.2 kg (157 lb)   02/07/25 69.9 kg (154 lb)                  Patient Active Problem List   Diagnosis    Routine general medical examination at a health care facility    Migraine headache    Hyperparathyroidism    Serum calcium elevated    Cystocele with second degree uterine prolapse     Past Surgical History:   Procedure Laterality Date    PARATHYROIDECTOMY Left 05/15/2023    Commiskey - left side parathyroid adenoma.    IN VAGINAL HYSTERECTOMY  10/16/2024    LAVH with BSO, lysis of adhesions, Dr. Tj Ramsey    TONSILLECTOMY, ADENOIDECTOMY, COMBINED      1967       Social History     Tobacco Use    Smoking status: Never    Smokeless tobacco: Never   Substance  Use Topics    Alcohol use: Yes     Comment: occassional     Family History   Problem Relation Age of Onset    Heart Disease Mother         Heart Disease    Other - See Comments Mother         Dementia at time of death    Heart Disease Father         Heart Disease, of complications of CAD at age 77, s/p bypass surgery    Diabetes Type 2  Brother     Diabetes Type 2  Brother 72         of hypothermia after he fell in garage    Uterine Cancer Maternal Grandmother 50         of cancer    Heart Disease Maternal Grandfather     Heart Disease Paternal Grandmother 72    Heart Disease Paternal Grandfather 60    Breast Cancer No family hx of         Cancer-breast    Colon Cancer No family hx of     Thyroid Disease No family hx of          Current Outpatient Medications   Medication Sig Dispense Refill    fish oil-omega-3 fatty acids 1000 MG capsule Take 1 capsule by mouth      methocarbamol (ROBAXIN) 500 MG tablet Take 1 tablet (500 mg) by mouth 3 times daily as needed for muscle spasms. 30 tablet 11    vitamin D3 (CHOLECALCIFEROL) 50 mcg (2000 units) tablet Take 1 tablet (50 mcg) by mouth daily       No Known Allergies  Recent Labs   Lab Test 10/02/24  1137 23  0725 22  0952 22  1353 21  0714 19  0724 19  1239   LDL  --  111*  --  105* 98   < >  --    HDL  --  48*  --  41 47   < >  --    TRIG  --  78  --  183* 102   < >  --    ALT  --  19  --   --  14  --   --    CR 0.84 0.91   < > 0.86 0.90   < > 0.72   GFRESTIMATED 74 68   < > 73 66   < > 81   GFRESTBLACK  --   --   --   --   --   --  >90   POTASSIUM 4.2 4.2   < > 4.1 4.1   < > 4.0   TSH  --  2.49  --   --  2.83  --   --     < > = values in this interval not displayed.      Current providers sharing in care for this patient include:  Patient Care Team:  Desiree Kang MD as PCP - General (Family Medicine)  Desiree Kang MD as Assigned PCP  Jeremie Rod DO as Obstetrician (OB/Gyn)  Tj Trinidad  "MD as MD (OB/Gyn)  Tj Trinidad MD as Assigned OBGYN Provider    The following health maintenance items are reviewed in Epic and correct as of today:  Health Maintenance   Topic Date Due    INFLUENZA VACCINE (1) 06/30/2025 (Originally 9/1/2024)    COVID-19 Vaccine (1 - 2024-25 season) 09/01/2025 (Originally 9/1/2024)    Pneumococcal Vaccine: 50+ Years (2 of 2 - PCV) 01/01/2026 (Originally 9/8/2022)    ZOSTER IMMUNIZATION (1 of 2) 01/01/2026 (Originally 1/22/2004)    DTAP/TDAP/TD IMMUNIZATION (2 - Td or Tdap) 01/01/2026 (Originally 8/26/2019)    COLORECTAL CANCER SCREENING  11/17/2025    MAMMO SCREENING  02/13/2026    MEDICARE ANNUAL WELLNESS VISIT  03/04/2026    FALL RISK ASSESSMENT  03/04/2026    GLUCOSE  10/02/2027    LIPID  11/24/2028    RSV VACCINE (1 - 1-dose 75+ series) 01/22/2029    ADVANCE CARE PLANNING  03/04/2030    DEXA  02/13/2039    HEPATITIS C SCREENING  Completed    PHQ-2 (once per calendar year)  Completed    HPV IMMUNIZATION  Aged Out    MENINGITIS IMMUNIZATION  Aged Out         Review of Systems  Constitutional, HEENT, cardiovascular, pulmonary, GI, , musculoskeletal, neuro, skin, endocrine and psych systems are negative, except as otherwise noted.     Objective    Exam  /80   Pulse 82   Temp 97  F (36.1  C) (Tympanic)   Resp 16   Ht 1.581 m (5' 2.25\")   Wt 70.1 kg (154 lb 9.6 oz)   LMP  (LMP Unknown)   SpO2 97%   Breastfeeding No   BMI 28.05 kg/m     Estimated body mass index is 28.05 kg/m  as calculated from the following:    Height as of this encounter: 1.581 m (5' 2.25\").    Weight as of this encounter: 70.1 kg (154 lb 9.6 oz).    Physical Exam  Constitutional:       General: She is not in acute distress.     Appearance: She is well-developed.   HENT:      Head: Normocephalic.      Right Ear: Tympanic membrane and external ear normal.      Left Ear: Tympanic membrane and external ear normal.      Nose: Nose normal.      Mouth/Throat:      Mouth: Mucous membranes are " moist.      Pharynx: Oropharynx is clear. No oropharyngeal exudate or posterior oropharyngeal erythema.   Eyes:      General:         Right eye: No discharge.         Left eye: No discharge.      Conjunctiva/sclera: Conjunctivae normal.      Pupils: Pupils are equal, round, and reactive to light.   Neck:      Thyroid: No thyromegaly.      Trachea: No tracheal deviation.   Cardiovascular:      Rate and Rhythm: Normal rate and regular rhythm.      Pulses: Normal pulses.      Heart sounds: Normal heart sounds, S1 normal and S2 normal. No murmur heard.     No friction rub. No gallop. No S3 or S4 sounds.   Pulmonary:      Effort: Pulmonary effort is normal. No respiratory distress.      Breath sounds: Normal breath sounds. No wheezing or rales.      Comments: Breast exam:  No masses palpable bilaterally.  No skin changes, tethering or axillary lymphadenopathy bilaterally.    Abdominal:      General: Bowel sounds are normal. There is no distension.      Palpations: Abdomen is soft. There is no mass.      Tenderness: There is no abdominal tenderness.   Genitourinary:     Comments: Pelvic/Rectal exams deferred per patient.  Musculoskeletal:         General: Normal range of motion.      Cervical back: Neck supple.   Lymphadenopathy:      Cervical: No cervical adenopathy.   Skin:     General: Skin is warm and dry.      Findings: No rash.   Neurological:      Mental Status: She is alert and oriented to person, place, and time.      Motor: No abnormal muscle tone.      Deep Tendon Reflexes: Reflexes are normal and symmetric.   Psychiatric:         Mood and Affect: Mood normal.         Thought Content: Thought content normal.         Judgment: Judgment normal.               3/4/2025   Mini Cog   Clock Draw Score 2 Normal   3 Item Recall 3 objects recalled   Mini Cog Total Score 5              ICD-10-CM    1. Encounter for Medicare annual wellness exam  Z00.00       2. Neck pain  M54.2 methocarbamol (ROBAXIN) 500 MG tablet       3. Cystocele with second degree uterine prolapse  N81.2       4. Hyperparathyroidism  E21.3            Mammogram last completed 2/12/24.  Would like to do these every other year.  DEXA last completed 2/13/24 and was normal.  Cologuard last completed 11/17/22 and was normal.  Pap Smear deferred due to age >65 and s/p hysterectomy.  Abdominal aortic aneurysm screening in 2022 was normal.  Declined Shingrix, Tdap, prevnar, flu, covid and RSV vaccines.  I think it is reasonable to stop aspirin given low risk for cardiovascular events.    Stable.  Methocarbamol refilled.    She is following with OB Gyn.  She has a pessary.  Considering further surgical intervention.  S/p parathyroidectomy last fall.  Stable.    No follow-ups on file.     36 minutes spent in review of chart, interviewing patient, examining patient, discussing plan, reviewing results and completing note on the date of encounter.    The longitudinal plan of care for the diagnosis(es)/condition(s) as documented were addressed during this visit. Due to the added complexity in care, I will continue to support Ada in the subsequent management and with ongoing continuity of care.    Desiree Kang MD

## 2025-03-06 ENCOUNTER — MYC MEDICAL ADVICE (OUTPATIENT)
Dept: FAMILY MEDICINE | Facility: OTHER | Age: 70
End: 2025-03-06
Payer: COMMERCIAL

## 2025-03-06 NOTE — TELEPHONE ENCOUNTER
Pt requesting Rx of Ivermectin to have on-hand for any cough/cold.     LOV 3/4/25    Routing to provider to review and respond.  Pradeep Crenshaw RN on 3/6/2025 at 1:11 PM

## 2025-03-06 NOTE — TELEPHONE ENCOUNTER
Doctors are NOT recommending treating colds with Ivermectin.  I would not recommend taking this medication for this type of issue.  Desiree Kang MD

## 2025-03-16 ENCOUNTER — OFFICE VISIT (OUTPATIENT)
Dept: FAMILY MEDICINE | Facility: OTHER | Age: 70
End: 2025-03-16
Attending: NURSE PRACTITIONER
Payer: MEDICARE

## 2025-03-16 ENCOUNTER — HOSPITAL ENCOUNTER (OUTPATIENT)
Dept: GENERAL RADIOLOGY | Facility: OTHER | Age: 70
Discharge: HOME | End: 2025-03-16
Attending: REGISTERED NURSE
Payer: MEDICARE

## 2025-03-16 VITALS
DIASTOLIC BLOOD PRESSURE: 100 MMHG | SYSTOLIC BLOOD PRESSURE: 142 MMHG | RESPIRATION RATE: 18 BRPM | HEART RATE: 90 BPM | BODY MASS INDEX: 27.32 KG/M2 | WEIGHT: 150.6 LBS | TEMPERATURE: 97.3 F | OXYGEN SATURATION: 96 %

## 2025-03-16 DIAGNOSIS — R05.2 SUBACUTE COUGH: Primary | ICD-10-CM

## 2025-03-16 DIAGNOSIS — R09.82 POST-NASAL DRAINAGE: ICD-10-CM

## 2025-03-16 PROCEDURE — 1126F AMNT PAIN NOTED NONE PRSNT: CPT | Performed by: REGISTERED NURSE

## 2025-03-16 PROCEDURE — 3077F SYST BP >= 140 MM HG: CPT | Performed by: REGISTERED NURSE

## 2025-03-16 PROCEDURE — 71046 X-RAY EXAM CHEST 2 VIEWS: CPT

## 2025-03-16 PROCEDURE — 99214 OFFICE O/P EST MOD 30 MIN: CPT | Performed by: REGISTERED NURSE

## 2025-03-16 PROCEDURE — 3080F DIAST BP >= 90 MM HG: CPT | Performed by: REGISTERED NURSE

## 2025-03-16 PROCEDURE — G0463 HOSPITAL OUTPT CLINIC VISIT: HCPCS | Mod: 25

## 2025-03-16 RX ORDER — FLUTICASONE PROPIONATE 50 MCG
2 SPRAY, SUSPENSION (ML) NASAL DAILY
Qty: 11.1 ML | Refills: 0 | Status: SHIPPED | OUTPATIENT
Start: 2025-03-16

## 2025-03-16 ASSESSMENT — ENCOUNTER SYMPTOMS
HEADACHES: 0
FATIGUE: 0
RHINORRHEA: 1
FEVER: 0
SORE THROAT: 0
COUGH: 1
ACTIVITY CHANGE: 0
CHILLS: 0

## 2025-03-16 ASSESSMENT — PAIN SCALES - GENERAL: PAINLEVEL_OUTOF10: NO PAIN (0)

## 2025-03-16 NOTE — PROGRESS NOTES
Ada Bal  1955    ASSESSMENT/PLAN:   1. Subacute cough (Primary)  2. Post-nasal drainage      - XR Chest 2 Views  - fluticasone (FLONASE) 50 MCG/ACT nasal spray; Spray 2 sprays into both nostrils daily.  Dispense: 11.1 mL; Refill: 0    Persistent cough over the past several months with waxing and waning symptoms.  No fevers.  Physical exam reassuring.  Her vitals are stable.  Chest x-ray today showing no acute acute infiltrate.  Discussed etiology of chronic cough including GERD and postnasal drainage.  Advised trial of Flonase and antihistamines such as Claritin, Zyrtec or Allegra.  Follow-up with PCP if symptoms persist for discussion of PPI.      Patient and/or family agrees with plan of care and verbalizes understating. AVS offered and printed if patient requested. Patient education provided verbally and written instructions were provided via AVS.     SUBJECTIVE:   CHIEF COMPLAINT/ REASON FOR VISIT  No chief complaint on file.     HISTORY OF PRESENT ILLNESS  Ada Bal is a pleasant 70 year old female presents to [facility] today for evaluation of a persistent cough since February.  Her cough slightly improved then she developed another cold and her cough returned.  Cough is productive, she has been using OTC severe cold and flu for symptom management.    Denies fevers  Denies headaches  Denies sore throat, nausea/vomiting or diarrhea    Her cough is worse at night when lying down.      I have reviewed the nursing notes.  I have reviewed allergies, medication list, problem list, and past medical history.    REVIEW OF SYSTEMS  Review of Systems   Constitutional:  Negative for activity change, chills, fatigue and fever.   HENT:  Positive for postnasal drip and rhinorrhea. Negative for congestion, ear pain and sore throat.    Respiratory:  Positive for cough.    Neurological:  Negative for headaches.   All other systems reviewed and are negative.       VITAL SIGNS  Vitals:    03/16/25 1022  03/16/25 1025   BP: (!) 142/100 (!) 142/100   BP Location: Left arm    Patient Position: Sitting    Cuff Size: Adult Regular    Pulse: 90    Resp: 18    Temp: 97.3  F (36.3  C)    TempSrc: Temporal    SpO2: 96%    Weight: 68.3 kg (150 lb 9.6 oz)       Body mass index is 27.32 kg/m .    OBJECTIVE:   PHYSICAL EXAM  Physical Exam  Vitals and nursing note reviewed.   Constitutional:       Appearance: Normal appearance. She is not ill-appearing.   HENT:      Right Ear: Tympanic membrane normal.      Left Ear: Tympanic membrane normal.      Nose: Rhinorrhea present. No congestion.      Mouth/Throat:      Pharynx: No oropharyngeal exudate or posterior oropharyngeal erythema.   Eyes:      Conjunctiva/sclera: Conjunctivae normal.      Pupils: Pupils are equal, round, and reactive to light.   Cardiovascular:      Rate and Rhythm: Normal rate and regular rhythm.   Pulmonary:      Effort: Pulmonary effort is normal.      Breath sounds: Normal breath sounds.   Neurological:      General: No focal deficit present.      Mental Status: She is alert.   Psychiatric:         Mood and Affect: Mood normal.          DIAGNOSTICS  Results for orders placed or performed in visit on 03/16/25   XR Chest 2 Views     Status: None    Narrative    EXAM: XR CHEST 2 VIEWS  LOCATION: [facility]  DATE: 3/16/2025    INDICATION:  Subacute cough  COMPARISON: 02/11/2019.      Impression    IMPRESSION: Cardiac silhouette is within normal limits. No focal airspace consolidation. No pleural effusion or discernible pneumothorax.        Nallely Dombovy, APRN CNP  [facility]

## 2025-03-16 NOTE — NURSING NOTE
Patient states that she has had a cough for over a week.  States the cough is raspy,  Unproductive cough  Took an over counter expectorant to help with cough

## 2025-03-16 NOTE — PATIENT INSTRUCTIONS
Chest xray was good today.   Work on your sinuses. Trial Flonase as directed. Add antihistamine to dry sinus such as zyrtec, allegra or claritin. Avoid benadryl and Sudafed. Sudafed is not recommended with harika blood pressure. Benadryl causes drowsiness.  Keep an eye on your blood pressure.

## 2025-03-24 ENCOUNTER — THERAPY VISIT (OUTPATIENT)
Dept: PHYSICAL THERAPY | Facility: OTHER | Age: 70
End: 2025-03-24
Attending: OBSTETRICS & GYNECOLOGY
Payer: MEDICARE

## 2025-03-24 DIAGNOSIS — N81.11 CYSTOCELE, MIDLINE: ICD-10-CM

## 2025-03-24 PROCEDURE — 97530 THERAPEUTIC ACTIVITIES: CPT | Mod: GP

## 2025-03-24 PROCEDURE — 97112 NEUROMUSCULAR REEDUCATION: CPT | Mod: GP

## 2025-03-24 PROCEDURE — 97161 PT EVAL LOW COMPLEX 20 MIN: CPT | Mod: GP

## 2025-03-24 NOTE — PROGRESS NOTES
PHYSICAL THERAPY EVALUATION  Type of Visit: Evaluation    Fall Risk Screen:  Fall screen completed by: PT  Have you fallen 2 or more times in the past year?: No  Have you fallen and had an injury in the past year?: No  Is patient a fall risk?: No    Subjective         Presenting condition or subjective complaint: Bladder prolapse following 10/16/2024 total hysterrectomy  Date of onset: 12/11/24 (~8 weeks s/p hysterectomy)    Relevant medical history:     Dates & types of surgery: 10/15/2024 total Hysterrectomy  Prior therapy history for the same diagnosis, illness or injury: No      Prior Level of Function  Transfers: Independent  Ambulation: Independent  ADL: Independent    Living Environment  Social support: With a significant other or spouse   Type of home: House   Stairs to enter the home: Yes 6 Is there a railing: Yes  Ramp: No   Stairs inside the home: No       Help at home: None  Equipment owned: Straight Cane   Employment: No    Hobbies/Interests: [employer] and [employer]    Patient goals for therapy: Strengthen the pelvic floor muscles.    Pain assessment: Pain denied     Objective      PELVIC EVALUATION  ADDITIONAL HISTORY:  Bladder History:  Feels bladder filling: Yes  Triggers for feeling of inability to wait to go to the bathroom: No    How long can you wait to urinate: couple hours  Gets up at night to urinate: Yes 0-1  Can stop the flow of urine when urinating: Sometimes  Volume of urine usually released: Medium   Other issues: Bladder infections  Number of bladder infections in last 12 months: 1  Fluid intake per day: water - 1 qt (32oz); caffeine - half cup coffee in morning, and half cup after lunch; alcohol - rarely, occasional beer or wine  Medications taken for bladder: No     Activities causing urine leak: Cough    Amount of urine typically leaked: small amount  Pads used to help with leaking: Yes I use this many pads per day: 1      Bowel History:  Frequency of bowel movement:  1-2x/day  Consistency of stool: Soft-formed    Ignores the urge to defecate: No  Other bowel issues:  none  Length of time spent trying to have a bowel movement: immediate    Sexual Function History:  Sexually active: Yes  Lubrication used: No   Pelvic pain:    none  State of menopause: Post-menopause (I am done with menopause)  Hormone medications: Yes      Number of previous pregnancies: 2  Number of deliveries: 2  If you have delivered before, did you have any of these issues during delivery: Vaginal delivery; Episiotomy  Have you been diagnosed with pelvic prolapse or abdominal separation: Yes  Do you get regular exercise: Yes  I do this type of exercise: walking  Do you have any history of trauma that is relevant to your care that you d like to share: No    Discussed reason for referral regarding pelvic health needs and external/internal pelvic floor muscle examination with patient/guardian.  Opportunity provided to ask questions and verbal consent for assessment and intervention was given.    POSTURE: WFL  LUMBAR SCREEN: AROM WFL  HIP SCREEN:  ROM: restrictions IR bilaterally, ER R hip   Functional Strength Testing: SLS: minimal instability, L>R    BREATHING SYMMETRY: Decreased rib cage mobility    PELVIC EXAM  External Visual Inspection:  At rest: Increased genital hiatal opening, cystocele visible past level of hymen at rest  With voluntary pelvic floor contraction: Perineal elevation  Relaxation of PFM: Partial/delayed relaxation  With intra-abdominal pressure: Cough: Perineal descent  Bearing down as defecation: Perineal descent    Integumentary:   Introitus: Loose, Introital gaping    External Digital Palpation per Perineum:   Ischiocavernosis: Unremarkable  Bulbo cavernosis: Unremarkable  Transverse perineal: Unremarkable  Levator ani: Unremarkable  Perineal body: Unremarkable  Public symphysis: Unremarkable    Internal Digital Palpation:  Per Vagina:  Digital Muscle Performance: P (Power): 2  E  (Endurance): 7  R (Repetitions): 2  Compensations: Breath holding      Pelvic Organ Prolapse:   Anterior: Past the level of the hymen    Assessment & Plan   CLINICAL IMPRESSIONS  Medical Diagnosis: Cystocele, midline    Treatment Diagnosis: pelvic floor weakness   Impression/Assessment: Patient is a 70 year old female with pelvic organ prolapse complaints.  The following significant findings have been identified: Decreased strength, Impaired muscle performance, Decreased activity tolerance, and Instability. These impairments interfere with their ability to perform self care tasks, recreational activities, household chores, household mobility, and community mobility as compared to previous level of function.     Clinical Decision Making (Complexity):  Clinical Presentation: Stable/Uncomplicated  Clinical Presentation Rationale: based on medical and personal factors listed in PT evaluation  Clinical Decision Making (Complexity): Low complexity    PLAN OF CARE  Treatment Interventions:  Modalities: Biofeedback, Cryotherapy, Cupping, Dry Needling, E-stim, Hot Pack, Ultrasound  Interventions: Manual Therapy, Neuromuscular Re-education, Therapeutic Activity, Therapeutic Exercise    Long Term Goals     PT Goal 1  Goal Identifier: pelvic floor contraction  Goal Description: patient will demonstrate pelvic floor contraction of 3/5 or greater to improve pressure management and reduce pressure from cystocele  Rationale: to maximize safety and independence with performance of ADLs and functional tasks;to maximize safety and independence with self cares;to maximize safety and independence within the home;to maximize safety and independence within the community  Target Date: 05/05/25  PT Goal 2  Goal Identifier: stress incontinence  Goal Description: patient will report 50% reduction of stress incontinence with coughing  Rationale: to maximize safety and independence with performance of ADLs and functional tasks;to maximize  safety and independence with self cares  Target Date: 05/19/25  PT Goal 3  Goal Identifier: pressure manangement  Goal Description: patient will report use of pressure management strategies which may include increased hydration, constipation management, positioning, pelvic floor exercises, breath control, in order to minimize pressure on pelvic floor that can exacerbate prolapse  Rationale: to maximize safety and independence with performance of ADLs and functional tasks;to maximize safety and independence with self cares;to maximize safety and independence within the home;to maximize safety and independence within the community  Target Date: 06/16/25      Frequency of Treatment: 1-2x/week  Duration of Treatment: 90 days    Education Assessment:   Learner/Method: Patient;Listening;Pictures/Video;No Barriers to Learning    Risks and benefits of evaluation/treatment have been explained.   Patient/Family/caregiver agrees with Plan of Care.     Evaluation Time:     PT Eval, Low Complexity Minutes (67054): 30     Signing Clinician: Krista Hewitt DPT        [facility]                                                                                   OUTPATIENT PHYSICAL THERAPY      PLAN OF TREATMENT FOR OUTPATIENT REHABILITATION   Patient's Last Name, First Name, Ada Briscoe YOB: 1955   Provider's Name   [facility]   Medical Record No.  [id number]     Onset Date: 12/11/24 (~8 weeks s/p hysterectomy)  Start of Care Date: 03/24/25     Medical Diagnosis:  Cystocele, midline    PT Treatment Diagnosis:  pelvic floor weakness Plan of Treatment  Frequency/Duration: 1-2x/week/ 90 days    Certification date from 03/24/25 to 06/21/25         See note for plan of treatment details and functional goals     Krista Hewitt DPT                         I CERTIFY THE NEED FOR THESE SERVICES FURNISHED UNDER        THIS PLAN OF TREATMENT AND  WHILE UNDER MY CARE     (Physician attestation of this document indicates review and certification of the therapy plan).              Referring Provider:  Tj Trinidad    Initial Assessment  See Epic Evaluation- Start of Care Date: 03/24/25

## 2025-03-26 ENCOUNTER — THERAPY VISIT (OUTPATIENT)
Dept: PHYSICAL THERAPY | Facility: OTHER | Age: 70
End: 2025-03-26
Attending: OBSTETRICS & GYNECOLOGY
Payer: MEDICARE

## 2025-03-26 DIAGNOSIS — N81.11 CYSTOCELE, MIDLINE: Primary | ICD-10-CM

## 2025-03-26 PROCEDURE — 97112 NEUROMUSCULAR REEDUCATION: CPT | Mod: GP

## 2025-03-26 PROCEDURE — 97110 THERAPEUTIC EXERCISES: CPT | Mod: GP

## 2025-03-26 PROCEDURE — 97530 THERAPEUTIC ACTIVITIES: CPT | Mod: GP

## 2025-03-31 ENCOUNTER — THERAPY VISIT (OUTPATIENT)
Dept: PHYSICAL THERAPY | Facility: OTHER | Age: 70
End: 2025-03-31
Attending: OBSTETRICS & GYNECOLOGY
Payer: MEDICARE

## 2025-03-31 DIAGNOSIS — N81.11 CYSTOCELE, MIDLINE: Primary | ICD-10-CM

## 2025-04-02 ENCOUNTER — THERAPY VISIT (OUTPATIENT)
Dept: PHYSICAL THERAPY | Facility: OTHER | Age: 70
End: 2025-04-02
Attending: OBSTETRICS & GYNECOLOGY
Payer: COMMERCIAL

## 2025-04-02 DIAGNOSIS — N81.11 CYSTOCELE, MIDLINE: Primary | ICD-10-CM

## 2025-04-07 ENCOUNTER — THERAPY VISIT (OUTPATIENT)
Dept: PHYSICAL THERAPY | Facility: OTHER | Age: 70
End: 2025-04-07
Attending: OBSTETRICS & GYNECOLOGY
Payer: MEDICARE

## 2025-04-07 DIAGNOSIS — N81.11 CYSTOCELE, MIDLINE: Primary | ICD-10-CM

## 2025-04-09 ENCOUNTER — THERAPY VISIT (OUTPATIENT)
Dept: PHYSICAL THERAPY | Facility: OTHER | Age: 70
End: 2025-04-09
Attending: OBSTETRICS & GYNECOLOGY
Payer: MEDICARE

## 2025-04-09 DIAGNOSIS — N81.11 CYSTOCELE, MIDLINE: Primary | ICD-10-CM

## 2025-04-14 ENCOUNTER — THERAPY VISIT (OUTPATIENT)
Dept: PHYSICAL THERAPY | Facility: OTHER | Age: 70
End: 2025-04-14
Attending: OBSTETRICS & GYNECOLOGY
Payer: MEDICARE

## 2025-04-14 DIAGNOSIS — N81.11 CYSTOCELE, MIDLINE: Primary | ICD-10-CM

## 2025-04-16 ENCOUNTER — THERAPY VISIT (OUTPATIENT)
Dept: PHYSICAL THERAPY | Facility: OTHER | Age: 70
End: 2025-04-16
Attending: OBSTETRICS & GYNECOLOGY
Payer: MEDICARE

## 2025-04-16 DIAGNOSIS — N81.11 CYSTOCELE, MIDLINE: Primary | ICD-10-CM

## 2025-04-23 ENCOUNTER — THERAPY VISIT (OUTPATIENT)
Dept: PHYSICAL THERAPY | Facility: OTHER | Age: 70
End: 2025-04-23
Attending: OBSTETRICS & GYNECOLOGY
Payer: MEDICARE

## 2025-04-23 DIAGNOSIS — N81.11 CYSTOCELE, MIDLINE: Primary | ICD-10-CM

## 2025-04-28 ENCOUNTER — THERAPY VISIT (OUTPATIENT)
Dept: PHYSICAL THERAPY | Facility: OTHER | Age: 70
End: 2025-04-28
Attending: OBSTETRICS & GYNECOLOGY
Payer: MEDICARE

## 2025-04-28 DIAGNOSIS — N81.11 CYSTOCELE, MIDLINE: Primary | ICD-10-CM

## 2025-04-28 NOTE — PROGRESS NOTES
04/28/25 0940   Appointment Info   Signing clinician's name / credentials Krista Hewitt DPT   Visits Used 10   Medical Diagnosis Cystocele, midline   PT Tx Diagnosis pelvic floor weakness   Progress Note/Certification   Start of Care Date 03/24/25   Onset of illness/injury or Date of Surgery 12/11/24  (~8 weeks s/p hysterectomy)   Therapy Frequency 1-2x/week   Predicted Duration 90 days   Certification date from 03/24/25   Certification date to 06/21/25   Progress Note Completed Date 03/24/25   PT Goal 1   Goal Identifier pelvic floor contraction   Goal Description patient will demonstrate pelvic floor contraction of 3/5 or greater to improve pressure management and reduce pressure from cystocele   Rationale to maximize safety and independence with performance of ADLs and functional tasks;to maximize safety and independence with self cares;to maximize safety and independence within the home;to maximize safety and independence within the community   Goal Progress not met, continue progressing   Target Date 05/05/25   PT Goal 2   Goal Identifier stress incontinence   Goal Description patient will report 50% reduction of stress incontinence with coughing   Rationale to maximize safety and independence with performance of ADLs and functional tasks;to maximize safety and independence with self cares   Target Date 05/19/25   Goal Progress patient reports she has experienced no stress incontinence for several weeks   Date Met 04/28/25   PT Goal 3   Goal Identifier pressure manangement   Goal Description patient will report use of pressure management strategies which may include increased hydration, constipation management, positioning, pelvic floor exercises, breath control, in order to minimize pressure on pelvic floor that can exacerbate prolapse   Rationale to maximize safety and independence with performance of ADLs and functional tasks;to maximize safety and independence with self cares;to maximize safety and  "independence within the home;to maximize safety and independence within the community   Target Date 06/16/25   Goal Progress not met, continue progressing   Subjective Report   Subjective Report Ada reports she's doing well, notes she has started participatin gin HEP without pessary, as she had noticed it was uncomfortable with pessary in place as it felt like it would move up too far. Notes when she has pessary out if feels like she has increased urgency.   Objective Measure 1   Objective Measure pelvic floor contraction   Details 2/7/2   Therapeutic Procedure/Exercise   Therapeutic Procedures: strength, endurance, ROM, flexibility minutes (60879) 10   Ther Proc 1 strength, mobility   Ther Proc 1 - Details clamshell, reverse clamshell, s/l hip abd, s/l thoracic rotation; cat/cow, MIRA, adrienne pose; supine hip ER/IR rocking  (deferred: standing march, hip abd, ext, staggered stance weight shift forward/backward, heel raise, toe raise;  piriformis stretch, active HS stretch; \"L\" stretch hands on plinth)   Therapeutic Activity   Therapeutic Activities: dynamic activities to improve functional performance minutes (95434) 15   Ther Act 1 education   Ther Act 1 - Details reviewed fast twitch & slow twitch muscle fibers, PF is 30% fast twitch, 70% slow twitch, endurance exercises to train slow twitch; pelvic floor anatomy, function, layers of muscles, relationship to core, diaphragm and back muscles   Ther Act 2 breathing   Ther Act 2 - Details instructed in diaphragmatic breathing to facilitate pelvic floor relaxation; pursed lips breathing with forceful exhale to facilitate pelvic floor contraction; instructed breath with slowing inhale and exhale to 4 counts, focusing on controling the breath   Ther Act 3 pelvic floor contraction   Ther Act 3 - Details instructed in pelvic floor & TA contraction seated, seated with lean forward, sit<>stand, quadruped, half kneeling, supine  (deferred: arms reaching wall slides; " "submax contraction with 15 second hold seated with lean forward x4, in standing with march x3; \"the knack\")   Patient Response/Progress well tolerated, demonstrates good understanding   Neuromuscular Re-education   Neuromuscular re-ed of mvmt, balance, coord, kinesthetic sense, posture, proprioception minutes (27697) 15   Neuro Re-ed 1 proprioception, coordination, balance   Neuro Re-ed 1 - Details seated on balance cushion for tactile feedback of pelvic floor muscle excursion, diaphragmatic breathing, PF contraction, pelvic mobility tilts ant/post, lateral, circles, march, LAQ, leg open/close  (deferred: isos with small ball for proprioception HS, glutes, adductors)   Manual Therapy   Manual Therapy: Mobilization, MFR, MLD, friction massage minutes (66062) 10   Manual Therapy 1 visceral mobilization   Manual Therapy 1 - Details AP fascial stacking upper & lower abdomen  (deferred: falciform ligament mobilization; posterior abdominal wall in standing)   Education   Learner/Method Patient;Listening;Pictures/Video;No Barriers to Learning   Plan   Home program HEP, handout given  ([employer] access code [email or web address])   Plan for next session progress pelvic floor contraction in various positions   Comments   Pelvic Health Informed Consent Statement Discussed with patient/guardian reason for referral regarding pelvic health needs and external/internal pelvic floor muscle examination.  Opportunity provided to ask questions and verbal consent for assessment and intervention was given.   Total Session Time   Timed Code Treatment Minutes 50   Total Treatment Time (sum of timed and untimed services) 50         PLAN  Continue therapy per current plan of care.    Beginning/End Dates of Progress Note Reporting Period:  03/24/25 to 04/28/2025    Referring Provider:  Tj Trinidad    "

## 2025-04-30 ENCOUNTER — THERAPY VISIT (OUTPATIENT)
Dept: PHYSICAL THERAPY | Facility: OTHER | Age: 70
End: 2025-04-30
Attending: OBSTETRICS & GYNECOLOGY
Payer: MEDICARE

## 2025-04-30 DIAGNOSIS — N81.11 CYSTOCELE, MIDLINE: Primary | ICD-10-CM

## 2025-05-20 NOTE — PROGRESS NOTES
Follow-Up Visit    S: Ms. dAa Bal is a 70 year old here for pessary check.  I last saw patient on 2/19/2025 for pessary check.  Patient was fitted for #3 pessary on 2/5/2025 management of recurrent cystocele.  This is post total laparoscopic hysterectomy bilateral salpingectomy oophorectomy sacrospinous ligament fixation with Dr. Trinidad in [place] October 2024.  She wonders if the pessary may be too small and does not supporting her enough.  She is sometimes having prolapse around her pessary.  She has been doing well removing and replacing pessary at home independently.     Current Pessary:#3 ring pessary with support     O:  /80   Pulse 66   Wt 67.8 kg (149 lb 6.4 oz)   LMP  (LMP Unknown)   Breastfeeding No   BMI 27.11 kg/m    Gen: Well-appearing, NAD  Pulm: nonlabored  Psych: appropriate mood and affect    Pelvic:  Normal appearing external female genitalia. Normal hair distribution. Vagina is without lesions. No discharge. Cervix and uterus absent. Chaperone present    PVR:9mL    A/P:  Ms. Ada Bal is a 70 year old here for pessary concerns as current pessary has not been as supportive and she is having prolapse around pessary.  Exam was normal, no evidence of ulcerated lesions, sores or vaginal discharge.  Patient denies any difficulties passing bowel movements or emptying her bladder.  No concerns for urinary tract infection.    Recommend trialing different pessary size for better support which patient is agreeable to.  Prior to her total laparoscopic hysterectomy bilateral salpingectomy oophorectomy sacrospinous ligament fixation October 2024, she was using a  #5 ring pessary with support.     -Today she was refitted for #4 ring pessary with support tolerated this well.  She will let us know if she continues to have prolapse around pessary.  We could try #5 ring pessary with support or Gellhorn pessary.  - She continues to do her pelvic floor exercises.  - Surgical  consultation at [facility] on June 30th.   - Follow up for pessary check in 3 months or sooner if concerns.     Jodie Barrett NP  [facility]    5/20/2025

## 2025-05-21 ENCOUNTER — OFFICE VISIT (OUTPATIENT)
Dept: OBGYN | Facility: OTHER | Age: 70
End: 2025-05-21
Attending: NURSE PRACTITIONER
Payer: MEDICARE

## 2025-05-21 VITALS
SYSTOLIC BLOOD PRESSURE: 136 MMHG | DIASTOLIC BLOOD PRESSURE: 80 MMHG | BODY MASS INDEX: 27.11 KG/M2 | WEIGHT: 149.4 LBS | HEART RATE: 66 BPM

## 2025-05-21 DIAGNOSIS — Z90.710 S/P HYSTERECTOMY: ICD-10-CM

## 2025-05-21 DIAGNOSIS — N81.11 CYSTOCELE, MIDLINE: ICD-10-CM

## 2025-05-21 DIAGNOSIS — R33.9 URINARY RETENTION: Primary | ICD-10-CM

## 2025-05-21 DIAGNOSIS — Z46.89 ENCOUNTER FOR FITTING AND ADJUSTMENT OF PESSARY: ICD-10-CM

## 2025-05-21 PROCEDURE — G0463 HOSPITAL OUTPT CLINIC VISIT: HCPCS | Mod: 25

## 2025-05-21 PROCEDURE — 57160 INSERT PESSARY/OTHER DEVICE: CPT | Performed by: NURSE PRACTITIONER

## 2025-05-21 ASSESSMENT — PAIN SCALES - GENERAL: PAINLEVEL_OUTOF10: NO PAIN (0)

## 2025-05-21 NOTE — NURSING NOTE
"Chief Complaint   Patient presents with    Pessary Check/Fit/Insert   Patient presents to OB/GYN clinic for routine pessary care. Patient would like to discuss a size change in pessary and she feels her cystocele is still bulging out around her pessary.     PVR: 9mL  Julieth Randall LPN on 5/21/2025 at 11:46 AM      Initial /80   Pulse 66   Wt 67.8 kg (149 lb 6.4 oz)   LMP  (LMP Unknown)   Breastfeeding No   BMI 27.11 kg/m   Estimated body mass index is 27.11 kg/m  as calculated from the following:    Height as of 3/4/25: 1.581 m (5' 2.25\").    Weight as of this encounter: 67.8 kg (149 lb 6.4 oz).  Medication Reconciliation: complete    Julieth Randall LPN  "

## 2025-08-08 ENCOUNTER — TELEPHONE (OUTPATIENT)
Dept: OBGYN | Facility: OTHER | Age: 70
End: 2025-08-08
Payer: COMMERCIAL

## (undated) RX ORDER — SODIUM CHLORIDE 9 MG/ML
INJECTION, SOLUTION INTRAVENOUS
Status: DISPENSED
Start: 2019-02-11

## (undated) RX ORDER — LEVOFLOXACIN 5 MG/ML
INJECTION, SOLUTION INTRAVENOUS
Status: DISPENSED
Start: 2019-02-11